# Patient Record
Sex: FEMALE | Race: WHITE | NOT HISPANIC OR LATINO | Employment: OTHER | URBAN - NONMETROPOLITAN AREA
[De-identification: names, ages, dates, MRNs, and addresses within clinical notes are randomized per-mention and may not be internally consistent; named-entity substitution may affect disease eponyms.]

---

## 2017-01-18 ENCOUNTER — OFFICE VISIT (OUTPATIENT)
Dept: MEDICAL GROUP | Facility: PHYSICIAN GROUP | Age: 67
End: 2017-01-18
Payer: MEDICARE

## 2017-01-18 VITALS
RESPIRATION RATE: 16 BRPM | BODY MASS INDEX: 35.2 KG/M2 | HEIGHT: 66 IN | SYSTOLIC BLOOD PRESSURE: 150 MMHG | TEMPERATURE: 97 F | DIASTOLIC BLOOD PRESSURE: 90 MMHG | WEIGHT: 219 LBS | HEART RATE: 75 BPM | OXYGEN SATURATION: 96 %

## 2017-01-18 DIAGNOSIS — E78.2 MIXED HYPERLIPIDEMIA: ICD-10-CM

## 2017-01-18 DIAGNOSIS — Z95.2 S/P AVR (AORTIC VALVE REPLACEMENT) AND AORTOPLASTY: ICD-10-CM

## 2017-01-18 DIAGNOSIS — Z00.00 HEALTH CARE MAINTENANCE: ICD-10-CM

## 2017-01-18 DIAGNOSIS — Z78.0 POST-MENOPAUSE: ICD-10-CM

## 2017-01-18 DIAGNOSIS — Z12.11 SCREEN FOR COLON CANCER: ICD-10-CM

## 2017-01-18 DIAGNOSIS — Z13.820 SCREENING FOR OSTEOPOROSIS: ICD-10-CM

## 2017-01-18 DIAGNOSIS — E55.9 VITAMIN D DEFICIENCY: ICD-10-CM

## 2017-01-18 DIAGNOSIS — E53.8 VITAMIN B12 DEFICIENCY: ICD-10-CM

## 2017-01-18 PROCEDURE — 99214 OFFICE O/P EST MOD 30 MIN: CPT | Performed by: NURSE PRACTITIONER

## 2017-01-18 ASSESSMENT — PATIENT HEALTH QUESTIONNAIRE - PHQ9: CLINICAL INTERPRETATION OF PHQ2 SCORE: 0

## 2017-01-18 NOTE — MR AVS SNAPSHOT
"        Deana Doss   2017 8:00 AM   Office Visit   MRN: [id number]    Department:  [facility]   Dept Phone:  [phone]    Description:  Female : 1950   Provider:  GILBERT Harry           Reason for Visit     New Patient est care.       Allergies as of 2017     Allergen Noted Reactions    Cipro Xr 2014   Hives      You were diagnosed with     S/P AVR (aortic valve replacement) and aortoplasty   [205856]       Vitamin B12 deficiency   [026837]       Mixed hyperlipidemia   [272.2.ICD-9-CM]       Health care maintenance   [037576]       Post-menopause   [731275]       Screening for osteoporosis   [525229]       Screen for colon cancer   [978489]       Elevated BP   [605957]         Vital Signs     Blood Pressure Pulse Temperature Respirations Height Weight    150/90 mmHg 75 36.1 °C (97 °F) 16 1.664 m (5' 5.5\") 99.338 kg (219 lb)    Body Mass Index Oxygen Saturation Smoking Status             35.88 kg/m2 96% Former Smoker         Basic Information     Date Of Birth Sex Race Ethnicity Preferred Language    1950 Female White Non- English      Your appointments     2017  7:30 AM   Adult Draw/Collection with [facility]   [facility] (--)    [address] [facility] [address]   [phone]            2017 10:40 AM   FOLLOW UP with Sarath Álvarez M.D.   [facility] (--)    [address] [facility] [id number] [phone]            2018  7:40 AM   Established Patient with GILBERT Harry   [facility] (--)    [address] [facility] [id number] [phone]           You will be receiving a confirmation call a few days before your appointment from our automated call confirmation system.              Problem List              ICD-10-CM Priority Class Noted - Resolved    Mixed hyperlipidemia E78.2   2012 - Present    Obese E66.9   " 4/30/2012 - Present    Vitamin D deficiency E55.9   7/5/2012 - Present    Postmenopausal atrophic vaginitis N95.2   8/16/2012 - Present    S/P AVR (aortic valve replacement) and aortoplasty Z95.2   8/11/2014 - Present    LBBB (left bundle branch block) I44.7   8/11/2014 - Present    Cutaneous skin tags L91.8   2/10/2016 - Present    Vitamin B12 deficiency E53.8   2/10/2016 - Present    Health care maintenance Z00.00   1/18/2017 - Present      Health Maintenance        Date Due Completion Dates    IMM DTaP/Tdap/Td Vaccine (1 - Tdap) 11/21/1969 ---    MAMMOGRAM 11/21/1990 ---    COLONOSCOPY 11/21/2000 ---    IMM ZOSTER VACCINE 11/21/2010 ---    PAP SMEAR 9/14/2015 9/14/2012    BONE DENSITY 11/21/2015 ---    IMM PNEUMOCOCCAL 65+ (ADULT) LOW/MEDIUM RISK SERIES (1 of 2 - PCV13) 11/21/2015 4/21/2011    IMM INFLUENZA (1) 9/1/2016 10/21/2013            Current Immunizations     Influenza TIV (IM) 10/21/2013    Pneumococcal polysaccharide vaccine (PPSV-23) 4/21/2011      Below and/or attached are the medications your provider expects you to take. Review all of your home medications and newly ordered medications with your provider and/or pharmacist. Follow medication instructions as directed by your provider and/or pharmacist. Please keep your medication list with you and share with your provider. Update the information when medications are discontinued, doses are changed, or new medications (including over-the-counter products) are added; and carry medication information at all times in the event of emergency situations     Allergies:  CIPRO XR - Hives               Medications  Valid as of: January 18, 2017 -  8:21 AM    Generic Name Brand Name Tablet Size Instructions for use    Aspirin (Tablet Delayed Response) ECOTRIN 81 MG Take 81 mg by mouth every day.        .                 Medicines prescribed today were sent to:     [facility] [place] - [address]    [address] [place] [address]     Phone: [phone] Fax: [phone]    Open 24 Hours?: No    [facility] - [place], [place] - [address] AT [facility] [address]    [address] [place] [address]    Phone: [phone] Fax: [phone]    Open 24 Hours?: No      Medication refill instructions:       If your prescription bottle indicates you have medication refills left, it is not necessary to call your provider’s office. Please contact your pharmacy and they will refill your medication.    If your prescription bottle indicates you do not have any refills left, you may request refills at any time through one of the following ways: The online "[employer] system (except Urgent Care), by calling your provider’s office, or by asking your pharmacy to contact your provider’s office with a refill request. Medication refills are processed only during regular business hours and may not be available until the next business day. Your provider may request additional information or to have a follow-up visit with you prior to refilling your medication.   *Please Note: Medication refills are assigned a new Rx number when refilled electronically. Your pharmacy may indicate that no refills were authorized even though a new prescription for the same medication is available at the pharmacy. Please request the medicine by name with the pharmacy before contacting your provider for a refill.        Your To Do List     Future Labs/Procedures Complete By Expires    CBC WITHOUT DIFFERENTIAL  As directed 7/21/2017    COMP METABOLIC PANEL  As directed 1/19/2018    DS-BONE DENSITY STUDY (DEXA)  As directed 1/18/2018    LIPID PROFILE  As directed 1/19/2018    OCCULT BLOOD FECES IMMUNOASSAY  As directed 1/19/2018         "[employer] Access Code: Activation code not generated  Current "[employer] Status: Active

## 2017-01-18 NOTE — ASSESSMENT & PLAN NOTE
Ongoing, unclear control. Patient has had some abnormal vitamin D levels, she was started on vitamin D prescription replacement in the past though strangely this caused worsening joint pain. She no longer takes vitamin D, she denies myalgias, joint pain, fatigue.

## 2017-01-18 NOTE — ASSESSMENT & PLAN NOTE
"New problem. Patient reports that in the past she had elevated BP which resolved after aortic valve replacement. She is not on medication. She tells me that her BP fluctuates from 130s-150s, she monitors at home. She denies CP, HA, SOB, change in vision. We discussed having her follow up in 2 weeks for BP check, she declines stating \"it's really nothing to worry about.\"  "

## 2017-01-18 NOTE — ASSESSMENT & PLAN NOTE
Aortic valve replacement in 2014, managed by Dr. Álvarez cardiology without complication, no mention of CP, SOB, profound fatigue, palpitation. Dr. Álvarez recommends statin therapy to LDL target, patient prefers to manage with TLM. She is due for lipid.

## 2017-01-18 NOTE — ASSESSMENT & PLAN NOTE
Refuses mammogram, despite education on risks   FIT reluctant but agreeable   prevnar 13 end of september 2016, due for pneumovax 9/2017  zostavax 5-6 years ago   Has never had DEXA

## 2017-01-18 NOTE — PROGRESS NOTES
"[facility]  Primary Care Office Visit - Problem-Oriented        History:     Deana Doss is a 66 y.o. female who is here today to discuss New Patient    This is a previously established patient, here to establish care with new provider and discuss chronic issues.      S/P AVR (aortic valve replacement) and aortoplasty  Aortic valve replacement in 2014, managed by Dr. Álvarez cardiology without complication, no mention of CP, SOB, profound fatigue, palpitation. Dr. Álvarez recommends statin therapy to LDL target, patient prefers to manage with TLM. She is due for lipid.        Vitamin B12 deficiency  No longer taking sublingual B12, no complaints of fatigue. Would like CBC checked.      Mixed hyperlipidemia  Chronic, uncontrolled. LDL last checked in October 2016, LDL not at goal, 135. Patient prefers to manage with lifestyle modifications. Due for repeat lipid.     Health care maintenance  Refuses mammogram, despite education on risks   FIT reluctant but agreeable   prevnar 13 end of september 2016, due for pneumovax 9/2017  zostavax 5-6 years ago   Has never had DEXA      Vitamin D deficiency  Ongoing, unclear control. Patient has had some abnormal vitamin D levels, she was started on vitamin D prescription replacement in the past though strangely this caused worsening joint pain. She no longer takes vitamin D, she denies myalgias, joint pain, fatigue.        Elevated BP  New problem. Patient reports that in the past she had elevated BP which resolved after aortic valve replacement. She is not on medication. She tells me that her BP fluctuates from 130s-150s, she monitors at home. She denies CP, HA, SOB, change in vision. We discussed having her follow up in 2 weeks for BP check, she declines stating \"it's really nothing to worry about.\"          Past Medical History   Diagnosis Date   • Mixed hyperlipidemia 4/30/2012   • Elevated BP 4/30/2012   • Hypertension    • Aortic stenosis 4/22/2014 "   • S/P AVR (aortic valve replacement) and aortoplasty 4/24/2014     Dr. Rutledge,  23 mm Hurst Perimount Magna pericardial valve, aortic root enlargement (1.5 cm CorMatrix patch),      • Atrial fibrillation (CMS-[facility]) 4/24/2014     Transient, perioperative and not recurrent     Past Surgical History   Procedure Laterality Date   • Wrist orif  9/22/2010     Performed by OSGOOD, PATRICK J at SURGERY [facility]   • Tendon repair  9/22/2010     Performed by OSGOOD, PATRICK J at SURGERY [facility]   • Cystectomy  2007     Bracial Cleft cyst removed at [facility]   • Aortic valve replacement  4/25/2014     Performed by Lilly Rutledge M.D. at SURGERY [facility]     Social History     Social History   • Marital Status:      Spouse Name: N/A   • Number of Children: N/A   • Years of Education: N/A     Occupational History   • Not on file.     Social History Main Topics   • Smoking status: Former Smoker -- 2.00 packs/day for 30 years     Types: Cigarettes     Quit date: 04/21/1996   • Smokeless tobacco: Never Used   • Alcohol Use: 0.0 oz/week      Comment: occ   • Drug Use: No   • Sexual Activity:     Partners: Male     Birth Control/ Protection: Post-Menopausal      Comment: , retired     Other Topics Concern   • Not on file     Social History Narrative     History   Smoking status   • Former Smoker -- 2.00 packs/day for 30 years   • Types: Cigarettes   • Quit date: 04/21/1996   Smokeless tobacco   • Never Used     Family History   Problem Relation Age of Onset   • Non-contributory Mother      basically healthy   • Heart Attack Father 76     MI   • Hypertension Father    • Hyperlipidemia Father    • Diabetes Paternal Grandfather 80     Type II Diabetes     Allergies   Allergen Reactions   • Cipro Xr Hives       Problem List:     Patient Active Problem List    Diagnosis Date Noted   • Health care maintenance 01/18/2017   • Cutaneous skin tags 02/10/2016   • Vitamin B12 deficiency 02/10/2016   • S/P AVR  "(aortic valve replacement) and aortoplasty 08/11/2014   • LBBB (left bundle branch block) 08/11/2014   • Postmenopausal atrophic vaginitis 08/16/2012   • Vitamin D deficiency 07/05/2012   • Mixed hyperlipidemia 04/30/2012   • Elevated BP 04/30/2012   • Obese 04/30/2012         Medications:     Current outpatient prescriptions:   •  aspirin EC (ECOTRIN) 81 MG TBEC, Take 81 mg by mouth every day., Disp: , Rfl:       Review of Systems:     Comprehensive review of systems negative      Physical Assessment:     VS: /90 mmHg  Pulse 75  Temp(Src) 36.1 °C (97 °F)  Resp 16  Ht 1.664 m (5' 5.5\")  Wt 99.338 kg (219 lb)  BMI 35.88 kg/m2  SpO2 96%    General: Well-developed, well-nourished female, obese body habitus     Head: PERRL, EOMI. Normocephalic. No facial asymmetry noted.  Cardiovasc:No JVD.  Systolic murmur. RRR. No thrills or bruits. Pulses 2+ and symmetric at all distal extremities.  Pulmonary: Lungs clear bilaterally.  Normal respiratory effort. No wheeze or crackles.   Extremities: No edema, no TTP bilateral calves. Pedal pulses intact. No joint effusions. LEs warm and well-perfused.  Neuro: Alert and oriented, CNs II-XII intact, no focal deficits.  Skin: scab from recent cryotherapy noted on right temple. No rashes noted. Skin warm, dry, intact    Psych: Dressed appropriately for the weather, pleasant and conversant.  Affect, mood & judgment appropriate.    Assessment/Plan:   Deana was seen today for new patient.    Diagnoses and all orders for this visit:    S/P AVR (aortic valve replacement) and aortoplasty, stable   - managed by cardiology     -     LIPID PROFILE; Future  -     COMP METABOLIC PANEL; Future    Elevated BP, new   - patient refuses to follow up for 2 weeks BP check, we discussed TLM    Vitamin B12 deficiency, chronic, unclear control   -     CBC WITHOUT DIFFERENTIAL; Future    Mixed hyperlipidemia, chronic, uncontrolled   - discussed goal LDL & TLM, repeat lipid ordered, she will " follow up with Dr. Whitley 4/3/17.  -     LIPID PROFILE; Future  -     COMP METABOLIC PANEL; Future    Health care maintenance  - see HPI for recommendations and discussion     Post-menopause  -     DS-BONE DENSITY STUDY (DEXA); Future    Screening for osteoporosis  -     DS-BONE DENSITY STUDY (DEXA); Future    Screen for colon cancer  -     OCCULT BLOOD FECES IMMUNOASSAY; Future    Patient is agreeable to the above plan and voiced understanding. All questions answered.     Please note that this dictation was created using voice recognition software. I have made every reasonable attempt to correct obvious errors, but I expect that there are errors of grammar and possibly content that I did not discover before finalizing the note.      BENJAMÍN Giordano  1/18/2017, 8:26 AM

## 2017-01-18 NOTE — ASSESSMENT & PLAN NOTE
Chronic, uncontrolled. LDL last checked in October 2016, LDL not at goal, 135. Patient prefers to manage with lifestyle modifications. Due for repeat lipid.

## 2017-01-23 ENCOUNTER — HOSPITAL ENCOUNTER (OUTPATIENT)
Dept: LAB | Facility: MEDICAL CENTER | Age: 67
End: 2017-01-23
Attending: NURSE PRACTITIONER
Payer: MEDICARE

## 2017-01-23 DIAGNOSIS — E53.8 VITAMIN B12 DEFICIENCY: ICD-10-CM

## 2017-01-23 DIAGNOSIS — Z95.2 S/P AVR (AORTIC VALVE REPLACEMENT) AND AORTOPLASTY: ICD-10-CM

## 2017-01-23 DIAGNOSIS — E78.2 MIXED HYPERLIPIDEMIA: ICD-10-CM

## 2017-01-23 LAB
ALBUMIN SERPL BCP-MCNC: 4.3 G/DL (ref 3.2–4.9)
ALBUMIN/GLOB SERPL: 1.3 G/DL
ALP SERPL-CCNC: 101 U/L (ref 30–99)
ALT SERPL-CCNC: 14 U/L (ref 2–50)
ANION GAP SERPL CALC-SCNC: 1 MMOL/L (ref 0–11.9)
AST SERPL-CCNC: 18 U/L (ref 12–45)
BILIRUB SERPL-MCNC: 0.6 MG/DL (ref 0.1–1.5)
BUN SERPL-MCNC: 16 MG/DL (ref 8–22)
CALCIUM SERPL-MCNC: 9.6 MG/DL (ref 8.5–10.5)
CHLORIDE SERPL-SCNC: 103 MMOL/L (ref 96–112)
CHOLEST SERPL-MCNC: 231 MG/DL (ref 100–199)
CO2 SERPL-SCNC: 35 MMOL/L (ref 20–33)
CREAT SERPL-MCNC: 0.97 MG/DL (ref 0.5–1.4)
ERYTHROCYTE [DISTWIDTH] IN BLOOD BY AUTOMATED COUNT: 41.1 FL (ref 35.9–50)
GLOBULIN SER CALC-MCNC: 3.3 G/DL (ref 1.9–3.5)
GLUCOSE SERPL-MCNC: 90 MG/DL (ref 65–99)
HCT VFR BLD AUTO: 46.4 % (ref 37–47)
HDLC SERPL-MCNC: 57 MG/DL
HGB BLD-MCNC: 14.7 G/DL (ref 12–16)
LDLC SERPL CALC-MCNC: 143 MG/DL
MCH RBC QN AUTO: 28.9 PG (ref 27–33)
MCHC RBC AUTO-ENTMCNC: 31.7 G/DL (ref 33.6–35)
MCV RBC AUTO: 91.3 FL (ref 81.4–97.8)
PLATELET # BLD AUTO: 241 K/UL (ref 164–446)
PMV BLD AUTO: 11.9 FL (ref 9–12.9)
POTASSIUM SERPL-SCNC: 4.5 MMOL/L (ref 3.6–5.5)
PROT SERPL-MCNC: 7.6 G/DL (ref 6–8.2)
RBC # BLD AUTO: 5.08 M/UL (ref 4.2–5.4)
SODIUM SERPL-SCNC: 139 MMOL/L (ref 135–145)
TRIGL SERPL-MCNC: 154 MG/DL (ref 0–149)
WBC # BLD AUTO: 7.9 K/UL (ref 4.8–10.8)

## 2017-01-23 PROCEDURE — 80061 LIPID PANEL: CPT

## 2017-01-23 PROCEDURE — 36415 COLL VENOUS BLD VENIPUNCTURE: CPT

## 2017-01-23 PROCEDURE — 85027 COMPLETE CBC AUTOMATED: CPT

## 2017-01-23 PROCEDURE — 80053 COMPREHEN METABOLIC PANEL: CPT

## 2017-04-03 ENCOUNTER — OFFICE VISIT (OUTPATIENT)
Dept: CARDIOLOGY | Facility: PHYSICIAN GROUP | Age: 67
End: 2017-04-03
Payer: MEDICARE

## 2017-04-03 VITALS
OXYGEN SATURATION: 92 % | WEIGHT: 218 LBS | BODY MASS INDEX: 35.03 KG/M2 | HEIGHT: 66 IN | SYSTOLIC BLOOD PRESSURE: 130 MMHG | DIASTOLIC BLOOD PRESSURE: 80 MMHG | HEART RATE: 81 BPM

## 2017-04-03 DIAGNOSIS — E78.2 MIXED HYPERLIPIDEMIA: ICD-10-CM

## 2017-04-03 DIAGNOSIS — I44.7 LBBB (LEFT BUNDLE BRANCH BLOCK): ICD-10-CM

## 2017-04-03 DIAGNOSIS — Z95.2 S/P AVR (AORTIC VALVE REPLACEMENT) AND AORTOPLASTY: ICD-10-CM

## 2017-04-03 PROCEDURE — G8432 DEP SCR NOT DOC, RNG: HCPCS | Performed by: INTERNAL MEDICINE

## 2017-04-03 PROCEDURE — 99213 OFFICE O/P EST LOW 20 MIN: CPT | Performed by: INTERNAL MEDICINE

## 2017-04-03 PROCEDURE — 3014F SCREEN MAMMO DOC REV: CPT | Mod: 8P | Performed by: INTERNAL MEDICINE

## 2017-04-03 PROCEDURE — 1101F PT FALLS ASSESS-DOCD LE1/YR: CPT | Mod: 8P | Performed by: INTERNAL MEDICINE

## 2017-04-03 PROCEDURE — 1036F TOBACCO NON-USER: CPT | Performed by: INTERNAL MEDICINE

## 2017-04-03 PROCEDURE — G8419 CALC BMI OUT NRM PARAM NOF/U: HCPCS | Performed by: INTERNAL MEDICINE

## 2017-04-03 PROCEDURE — 4040F PNEUMOC VAC/ADMIN/RCVD: CPT | Performed by: INTERNAL MEDICINE

## 2017-04-03 ASSESSMENT — ENCOUNTER SYMPTOMS
WHEEZING: 0
BRUISES/BLEEDS EASILY: 0
MYALGIAS: 0
ORTHOPNEA: 0
COUGH: 0
FALLS: 0
PND: 0

## 2017-04-03 ASSESSMENT — LIFESTYLE VARIABLES: SUBSTANCE_ABUSE: 0

## 2017-04-03 NOTE — Clinical Note
[facility] [address]  Phone: [phone]  Fax: [phone]              Deana Doss  1950    Encounter Date: 4/3/2017    Sarath Álvarez M.D.          PROGRESS NOTE:  Subjective:   Deana Doss is a 66 y.o. female who presents today for follow-up of her aortic valve. Blood pressure has been well-controlled.  She has been off of her lipid diet recently.  Cardiac status has been clinically stable since last visit.  No chest pain, palpitations, orthopnea, edema, syncope, or near-syncope.  Exertional capacity has been stable.    Past Medical History   Diagnosis Date   • Mixed hyperlipidemia 4/30/2012   • Elevated BP 4/30/2012   • Hypertension    • Aortic stenosis 4/22/2014   • S/P AVR (aortic valve replacement) and aortoplasty 4/24/2014     Dr. Rutledge,  23 mm Hurst Perimount Magna pericardial valve, aortic root enlargement (1.5 cm CorMatrix patch),      • Atrial fibrillation (CMS-[facility]) 4/24/2014     Transient, perioperative and not recurrent     Past Surgical History   Procedure Laterality Date   • Wrist orif  9/22/2010     Performed by OSGOOD, PATRICK J at SURGERY [facility]   • Tendon repair  9/22/2010     Performed by OSGOOD, PATRICK J at SURGERY [facility]   • Cystectomy  2007     Bracial Cleft cyst removed at [facility]   • Aortic valve replacement  4/25/2014     Performed by Lilly Rutledge M.D. at SURGERY [facility]     Family History   Problem Relation Age of Onset   • Non-contributory Mother      basically healthy   • Heart Attack Father 76     MI   • Hypertension Father    • Hyperlipidemia Father    • Diabetes Paternal Grandfather 80     Type II Diabetes     History   Smoking status   • Former Smoker -- 2.00 packs/day for 30 years   • Types: Cigarettes   • Quit date: 04/21/1996   Smokeless tobacco   • Never Used     Allergies   Allergen Reactions   • Cipro Xr Hives     Outpatient Encounter  "Prescriptions as of 4/3/2017   Medication Sig Dispense Refill   • aspirin EC (ECOTRIN) 81 MG TBEC Take 81 mg by mouth every day.       No facility-administered encounter medications on file as of 4/3/2017.     Review of Systems   HENT: Negative for nosebleeds.    Respiratory: Negative for cough and wheezing.    Cardiovascular: Negative for orthopnea and PND.   Musculoskeletal: Negative for myalgias and falls.   Endo/Heme/Allergies: Does not bruise/bleed easily.   Psychiatric/Behavioral: Negative for substance abuse.        Objective:   /80 mmHg  Pulse 81  Ht 1.676 m (5' 5.98\")  Wt 98.884 kg (218 lb)  BMI 35.20 kg/m2  SpO2 92%    Physical Exam   Constitutional: She is oriented to person, place, and time. She appears well-developed and well-nourished.   Eyes: Conjunctivae are normal. No scleral icterus.   Neck: No JVD present.   Cardiovascular: Normal rate, regular rhythm, S1 normal, S2 normal and intact distal pulses.  Exam reveals no gallop, no S3, no S4 and no friction rub.    Murmur (2/6 short carrie, LSB) heard.  Pulmonary/Chest: Effort normal and breath sounds normal.   Musculoskeletal: She exhibits no edema.   Neurological: She is alert and oriented to person, place, and time.   Skin: Skin is warm and dry.   Psychiatric: She has a normal mood and affect. Thought content normal.       DonyaWe reviewed her lipids.sment:     1. S/P AVR (aortic valve replacement) and aortoplasty     2. LBBB (left bundle branch block)     3. Mixed hyperlipidemia       We reviewed her lipids. She does not want to consider statin therapy or other drug therapy but wants to work on diet. Otherwise cardiac status is stable.  Medical Decision Making:  Today's Assessment / Status / Plan:     She will continue primary follow-up with Sandi Jay and annual follow-up with cardiology but she will check her lipids after 3-4 months of dietary attention and call for results and consider statin therapy if LDL has increased further. " Immediate reevaluation if any symptoms.      No Recipients

## 2017-04-03 NOTE — PROGRESS NOTES
Subjective:   Deana Doss is a 66 y.o. female who presents today for follow-up of her aortic valve. Blood pressure has been well-controlled.  She has been off of her lipid diet recently.  Cardiac status has been clinically stable since last visit.  No chest pain, palpitations, orthopnea, edema, syncope, or near-syncope.  Exertional capacity has been stable.    Past Medical History   Diagnosis Date   • Mixed hyperlipidemia 4/30/2012   • Elevated BP 4/30/2012   • Hypertension    • Aortic stenosis 4/22/2014   • S/P AVR (aortic valve replacement) and aortoplasty 4/24/2014     Dr. Rutledge,  23 mm Hurst Perimount Magna pericardial valve, aortic root enlargement (1.5 cm CorMatrix patch),      • Atrial fibrillation (CMS-[facility]) 4/24/2014     Transient, perioperative and not recurrent     Past Surgical History   Procedure Laterality Date   • Wrist orif  9/22/2010     Performed by OSGOOD, PATRICK J at SURGERY [facility]   • Tendon repair  9/22/2010     Performed by OSGOOD, PATRICK J at [facility]   • Cystectomy  2007     Bracial Cleft cyst removed at [facility]   • Aortic valve replacement  4/25/2014     Performed by Lilly Rutledge M.D. at SURGERY [facility]     Family History   Problem Relation Age of Onset   • Non-contributory Mother      basically healthy   • Heart Attack Father 76     MI   • Hypertension Father    • Hyperlipidemia Father    • Diabetes Paternal Grandfather 80     Type II Diabetes     History   Smoking status   • Former Smoker -- 2.00 packs/day for 30 years   • Types: Cigarettes   • Quit date: 04/21/1996   Smokeless tobacco   • Never Used     Allergies   Allergen Reactions   • Cipro Xr Hives     Outpatient Encounter Prescriptions as of 4/3/2017   Medication Sig Dispense Refill   • aspirin EC (ECOTRIN) 81 MG TBEC Take 81 mg by mouth every day.       No facility-administered encounter medications on file as of 4/3/2017.     Review of Systems   HENT: Negative for nosebleeds.   "  Respiratory: Negative for cough and wheezing.    Cardiovascular: Negative for orthopnea and PND.   Musculoskeletal: Negative for myalgias and falls.   Endo/Heme/Allergies: Does not bruise/bleed easily.   Psychiatric/Behavioral: Negative for substance abuse.        Objective:   /80 mmHg  Pulse 81  Ht 1.676 m (5' 5.98\")  Wt 98.884 kg (218 lb)  BMI 35.20 kg/m2  SpO2 92%    Physical Exam   Constitutional: She is oriented to person, place, and time. She appears well-developed and well-nourished.   Eyes: Conjunctivae are normal. No scleral icterus.   Neck: No JVD present.   Cardiovascular: Normal rate, regular rhythm, S1 normal, S2 normal and intact distal pulses.  Exam reveals no gallop, no S3, no S4 and no friction rub.    Murmur (2/6 short carrie, LSB) heard.  Pulmonary/Chest: Effort normal and breath sounds normal.   Musculoskeletal: She exhibits no edema.   Neurological: She is alert and oriented to person, place, and time.   Skin: Skin is warm and dry.   Psychiatric: She has a normal mood and affect. Thought content normal.       AssesWe reviewed her lipids.sment:     1. S/P AVR (aortic valve replacement) and aortoplasty     2. LBBB (left bundle branch block)     3. Mixed hyperlipidemia       We reviewed her lipids. She does not want to consider statin therapy or other drug therapy but wants to work on diet. Otherwise cardiac status is stable.  Medical Decision Making:  Today's Assessment / Status / Plan:     She will continue primary follow-up with Sandi Jay and annual follow-up with cardiology but she will check her lipids after 3-4 months of dietary attention and call for results and consider statin therapy if LDL has increased further. Immediate reevaluation if any symptoms.  "

## 2017-06-02 ENCOUNTER — HOSPITAL ENCOUNTER (OUTPATIENT)
Dept: LAB | Facility: MEDICAL CENTER | Age: 67
End: 2017-06-02
Attending: INTERNAL MEDICINE
Payer: MEDICARE

## 2017-06-02 LAB
CHOLEST SERPL-MCNC: 236 MG/DL (ref 100–199)
HDLC SERPL-MCNC: 60 MG/DL
LDLC SERPL CALC-MCNC: 145 MG/DL
TRIGL SERPL-MCNC: 154 MG/DL (ref 0–149)

## 2017-06-02 PROCEDURE — 36415 COLL VENOUS BLD VENIPUNCTURE: CPT

## 2017-06-02 PROCEDURE — 80061 LIPID PANEL: CPT

## 2017-06-29 ENCOUNTER — TELEPHONE (OUTPATIENT)
Dept: CARDIOLOGY | Facility: MEDICAL CENTER | Age: 67
End: 2017-06-29

## 2017-06-29 NOTE — TELEPHONE ENCOUNTER
----- Message from Sarath Álvarez M.D. sent at 6/29/2017  7:20 AM PDT -----    Please let her know LDL no better and I don't think she has seen Sandi Jay since I saw her therefore we need to start statin and check CMP and LP in 4-6 weeks.  Please start atorva 20mg daily if she agrees.  Thanks.

## 2017-11-07 ENCOUNTER — APPOINTMENT (RX ONLY)
Dept: URBAN - NONMETROPOLITAN AREA CLINIC 15 | Facility: CLINIC | Age: 67
Setting detail: DERMATOLOGY
End: 2017-11-07

## 2017-11-07 DIAGNOSIS — D18.0 HEMANGIOMA: ICD-10-CM

## 2017-11-07 DIAGNOSIS — L81.4 OTHER MELANIN HYPERPIGMENTATION: ICD-10-CM

## 2017-11-07 DIAGNOSIS — L82.1 OTHER SEBORRHEIC KERATOSIS: ICD-10-CM

## 2017-11-07 DIAGNOSIS — L57.0 ACTINIC KERATOSIS: ICD-10-CM

## 2017-11-07 DIAGNOSIS — L82.0 INFLAMED SEBORRHEIC KERATOSIS: ICD-10-CM

## 2017-11-07 DIAGNOSIS — D22 MELANOCYTIC NEVI: ICD-10-CM

## 2017-11-07 DIAGNOSIS — Z85.828 PERSONAL HISTORY OF OTHER MALIGNANT NEOPLASM OF SKIN: ICD-10-CM

## 2017-11-07 PROBLEM — D18.01 HEMANGIOMA OF SKIN AND SUBCUTANEOUS TISSUE: Status: ACTIVE | Noted: 2017-11-07

## 2017-11-07 PROBLEM — D22.61 MELANOCYTIC NEVI OF RIGHT UPPER LIMB, INCLUDING SHOULDER: Status: ACTIVE | Noted: 2017-11-07

## 2017-11-07 PROBLEM — D22.62 MELANOCYTIC NEVI OF LEFT UPPER LIMB, INCLUDING SHOULDER: Status: ACTIVE | Noted: 2017-11-07

## 2017-11-07 PROCEDURE — 17003 DESTRUCT PREMALG LES 2-14: CPT

## 2017-11-07 PROCEDURE — ? LIQUID NITROGEN

## 2017-11-07 PROCEDURE — 99213 OFFICE O/P EST LOW 20 MIN: CPT | Mod: 25

## 2017-11-07 PROCEDURE — ? COUNSELING

## 2017-11-07 PROCEDURE — 17110 DESTRUCTION B9 LES UP TO 14: CPT

## 2017-11-07 PROCEDURE — 17000 DESTRUCT PREMALG LESION: CPT | Mod: 59

## 2017-11-07 PROCEDURE — ? OBSERVATION

## 2017-11-07 ASSESSMENT — LOCATION ZONE DERM
LOCATION ZONE: NECK
LOCATION ZONE: TRUNK
LOCATION ZONE: EAR
LOCATION ZONE: HAND
LOCATION ZONE: NOSE
LOCATION ZONE: FACE
LOCATION ZONE: ARM

## 2017-11-07 ASSESSMENT — LOCATION SIMPLE DESCRIPTION DERM
LOCATION SIMPLE: RIGHT UPPER ARM
LOCATION SIMPLE: RIGHT EYEBROW
LOCATION SIMPLE: LEFT FOREARM
LOCATION SIMPLE: LEFT HAND
LOCATION SIMPLE: RIGHT HAND
LOCATION SIMPLE: RIGHT FOREHEAD
LOCATION SIMPLE: LEFT EYEBROW
LOCATION SIMPLE: CHEST
LOCATION SIMPLE: LEFT UPPER BACK
LOCATION SIMPLE: LEFT ANTERIOR NECK
LOCATION SIMPLE: RIGHT FOREARM
LOCATION SIMPLE: LEFT UPPER ARM
LOCATION SIMPLE: LEFT FOREHEAD
LOCATION SIMPLE: LEFT CHEEK
LOCATION SIMPLE: NOSE
LOCATION SIMPLE: RIGHT CLAVICULAR SKIN
LOCATION SIMPLE: RIGHT EAR
LOCATION SIMPLE: RIGHT CHEEK
LOCATION SIMPLE: RIGHT ANTERIOR NECK

## 2017-11-07 ASSESSMENT — LOCATION DETAILED DESCRIPTION DERM
LOCATION DETAILED: RIGHT RADIAL DORSAL HAND
LOCATION DETAILED: LEFT INFERIOR ANTERIOR NECK
LOCATION DETAILED: LEFT MEDIAL MALAR CHEEK
LOCATION DETAILED: LEFT VENTRAL PROXIMAL FOREARM
LOCATION DETAILED: LEFT PROXIMAL RADIAL DORSAL FOREARM
LOCATION DETAILED: NASAL DORSUM
LOCATION DETAILED: RIGHT INFERIOR ANTERIOR NECK
LOCATION DETAILED: LEFT ANTERIOR DISTAL UPPER ARM
LOCATION DETAILED: RIGHT INFERIOR MEDIAL FOREHEAD
LOCATION DETAILED: LEFT RADIAL DORSAL HAND
LOCATION DETAILED: RIGHT ANTERIOR DISTAL UPPER ARM
LOCATION DETAILED: RIGHT INFERIOR FOREHEAD
LOCATION DETAILED: LEFT VENTRAL DISTAL FOREARM
LOCATION DETAILED: RIGHT VENTRAL PROXIMAL FOREARM
LOCATION DETAILED: RIGHT CLAVICULAR SKIN
LOCATION DETAILED: RIGHT CLAVICULAR NECK
LOCATION DETAILED: RIGHT INFERIOR LATERAL NECK
LOCATION DETAILED: LEFT SUPERIOR FOREHEAD
LOCATION DETAILED: LEFT CENTRAL EYEBROW
LOCATION DETAILED: RIGHT ANTERIOR PROXIMAL UPPER ARM
LOCATION DETAILED: LEFT SUPERIOR MEDIAL UPPER BACK
LOCATION DETAILED: RIGHT INFERIOR HELIX
LOCATION DETAILED: RIGHT CENTRAL BUCCAL CHEEK
LOCATION DETAILED: RIGHT PROXIMAL RADIAL DORSAL FOREARM
LOCATION DETAILED: RIGHT LATERAL EYEBROW
LOCATION DETAILED: MIDDLE STERNUM
LOCATION DETAILED: LEFT INFERIOR MEDIAL MALAR CHEEK
LOCATION DETAILED: LEFT LATERAL EYEBROW
LOCATION DETAILED: LEFT ANTECUBITAL SKIN
LOCATION DETAILED: LEFT INFERIOR CENTRAL MALAR CHEEK
LOCATION DETAILED: LEFT FOREHEAD
LOCATION DETAILED: LEFT INFERIOR LATERAL NECK
LOCATION DETAILED: RIGHT VENTRAL DISTAL FOREARM
LOCATION DETAILED: RIGHT FOREHEAD

## 2017-11-07 NOTE — PROCEDURE: OBSERVATION
Detail Level: Detailed
Body Location Override (Optional - Billing Will Still Be Based On Selected Body Map Location If Applicable): Right lateral lower Forehead
Size Of Lesion In Cm (Optional): 0

## 2017-11-07 NOTE — PROCEDURE: LIQUID NITROGEN
Render Post-Care Instructions In Note?: no
Post-Care Instructions: I reviewed with the patient in detail post-care instructions. Patient is to wear sunprotection, and avoid picking at any of the treated lesions. Pt may apply Vaseline to crusted or scabbing areas.
Consent: The patient's consent was obtained including but not limited to risks of crusting, scabbing, blistering, scarring, darker or lighter pigmentary change, recurrence, incomplete removal and infection.
Duration Of Freeze Thaw-Cycle (Seconds): 0
Detail Level: Detailed
Medical Necessity Clause: This procedure was medically necessary because the lesions that were treated were:
Medical Necessity Information: It is in your best interest to select a reason for this procedure from the list below. All of these items fulfill various CMS LCD requirements except the new and changing color options.

## 2017-12-06 ENCOUNTER — TELEPHONE (OUTPATIENT)
Dept: MEDICAL GROUP | Facility: PHYSICIAN GROUP | Age: 67
End: 2017-12-06

## 2017-12-06 DIAGNOSIS — E53.8 VITAMIN B12 DEFICIENCY: ICD-10-CM

## 2017-12-06 DIAGNOSIS — E78.2 MIXED HYPERLIPIDEMIA: ICD-10-CM

## 2017-12-06 DIAGNOSIS — I15.9 SECONDARY HYPERTENSION: ICD-10-CM

## 2017-12-06 DIAGNOSIS — E55.9 VITAMIN D DEFICIENCY: ICD-10-CM

## 2017-12-06 NOTE — TELEPHONE ENCOUNTER
1. Caller Name: Deana                      Call Back Number: [phone] (home)       2. Message: Patient would like to know if she can get labs ordered before her next appointment 11/24/2018.     3. Patient approves office to leave a detailed voicemail/MyChart message: N\A

## 2018-01-16 ENCOUNTER — HOSPITAL ENCOUNTER (OUTPATIENT)
Dept: LAB | Facility: MEDICAL CENTER | Age: 68
End: 2018-01-16
Attending: NURSE PRACTITIONER
Payer: MEDICARE

## 2018-01-16 DIAGNOSIS — E53.8 VITAMIN B12 DEFICIENCY: ICD-10-CM

## 2018-01-16 DIAGNOSIS — I15.9 SECONDARY HYPERTENSION: ICD-10-CM

## 2018-01-16 DIAGNOSIS — E55.9 VITAMIN D DEFICIENCY: ICD-10-CM

## 2018-01-16 DIAGNOSIS — E78.2 MIXED HYPERLIPIDEMIA: ICD-10-CM

## 2018-01-16 LAB
25(OH)D3 SERPL-MCNC: 23 NG/ML (ref 30–100)
ALBUMIN SERPL BCP-MCNC: 4.4 G/DL (ref 3.2–4.9)
ALBUMIN/GLOB SERPL: 1.5 G/DL
ALP SERPL-CCNC: 87 U/L (ref 30–99)
ALT SERPL-CCNC: 11 U/L (ref 2–50)
ANION GAP SERPL CALC-SCNC: 8 MMOL/L (ref 0–11.9)
AST SERPL-CCNC: 19 U/L (ref 12–45)
BASOPHILS # BLD AUTO: 0.6 % (ref 0–1.8)
BASOPHILS # BLD: 0.04 K/UL (ref 0–0.12)
BILIRUB SERPL-MCNC: 0.7 MG/DL (ref 0.1–1.5)
BUN SERPL-MCNC: 14 MG/DL (ref 8–22)
CALCIUM SERPL-MCNC: 9.4 MG/DL (ref 8.5–10.5)
CHLORIDE SERPL-SCNC: 104 MMOL/L (ref 96–112)
CHOLEST SERPL-MCNC: 223 MG/DL (ref 100–199)
CO2 SERPL-SCNC: 29 MMOL/L (ref 20–33)
CREAT SERPL-MCNC: 0.85 MG/DL (ref 0.5–1.4)
EOSINOPHIL # BLD AUTO: 0.08 K/UL (ref 0–0.51)
EOSINOPHIL NFR BLD: 1.2 % (ref 0–6.9)
ERYTHROCYTE [DISTWIDTH] IN BLOOD BY AUTOMATED COUNT: 41.1 FL (ref 35.9–50)
GLOBULIN SER CALC-MCNC: 3 G/DL (ref 1.9–3.5)
GLUCOSE SERPL-MCNC: 81 MG/DL (ref 65–99)
HCT VFR BLD AUTO: 45.2 % (ref 37–47)
HDLC SERPL-MCNC: 60 MG/DL
HGB BLD-MCNC: 14.7 G/DL (ref 12–16)
IMM GRANULOCYTES # BLD AUTO: 0.03 K/UL (ref 0–0.11)
IMM GRANULOCYTES NFR BLD AUTO: 0.4 % (ref 0–0.9)
LDLC SERPL CALC-MCNC: 142 MG/DL
LYMPHOCYTES # BLD AUTO: 2.02 K/UL (ref 1–4.8)
LYMPHOCYTES NFR BLD: 29.2 % (ref 22–41)
MCH RBC QN AUTO: 29.8 PG (ref 27–33)
MCHC RBC AUTO-ENTMCNC: 32.5 G/DL (ref 33.6–35)
MCV RBC AUTO: 91.5 FL (ref 81.4–97.8)
MONOCYTES # BLD AUTO: 0.41 K/UL (ref 0–0.85)
MONOCYTES NFR BLD AUTO: 5.9 % (ref 0–13.4)
NEUTROPHILS # BLD AUTO: 4.34 K/UL (ref 2–7.15)
NEUTROPHILS NFR BLD: 62.7 % (ref 44–72)
NRBC # BLD AUTO: 0 K/UL
NRBC BLD-RTO: 0 /100 WBC
PLATELET # BLD AUTO: 218 K/UL (ref 164–446)
PMV BLD AUTO: 12.1 FL (ref 9–12.9)
POTASSIUM SERPL-SCNC: 4.8 MMOL/L (ref 3.6–5.5)
PROT SERPL-MCNC: 7.4 G/DL (ref 6–8.2)
RBC # BLD AUTO: 4.94 M/UL (ref 4.2–5.4)
SODIUM SERPL-SCNC: 141 MMOL/L (ref 135–145)
TRIGL SERPL-MCNC: 107 MG/DL (ref 0–149)
VIT B12 SERPL-MCNC: 558 PG/ML (ref 211–911)
WBC # BLD AUTO: 6.9 K/UL (ref 4.8–10.8)

## 2018-01-16 PROCEDURE — 82607 VITAMIN B-12: CPT

## 2018-01-16 PROCEDURE — 85025 COMPLETE CBC W/AUTO DIFF WBC: CPT

## 2018-01-16 PROCEDURE — 80053 COMPREHEN METABOLIC PANEL: CPT

## 2018-01-16 PROCEDURE — 36415 COLL VENOUS BLD VENIPUNCTURE: CPT

## 2018-01-16 PROCEDURE — 82306 VITAMIN D 25 HYDROXY: CPT

## 2018-01-16 PROCEDURE — 80061 LIPID PANEL: CPT

## 2018-01-24 ENCOUNTER — TELEPHONE (OUTPATIENT)
Dept: MEDICAL GROUP | Facility: PHYSICIAN GROUP | Age: 68
End: 2018-01-24

## 2018-01-24 ENCOUNTER — OFFICE VISIT (OUTPATIENT)
Dept: MEDICAL GROUP | Facility: PHYSICIAN GROUP | Age: 68
End: 2018-01-24
Payer: MEDICARE

## 2018-01-24 VITALS
DIASTOLIC BLOOD PRESSURE: 80 MMHG | HEIGHT: 66 IN | SYSTOLIC BLOOD PRESSURE: 130 MMHG | OXYGEN SATURATION: 93 % | TEMPERATURE: 97.9 F | WEIGHT: 215.3 LBS | HEART RATE: 82 BPM | RESPIRATION RATE: 16 BRPM | BODY MASS INDEX: 34.6 KG/M2

## 2018-01-24 DIAGNOSIS — Z00.00 HEALTH CARE MAINTENANCE: ICD-10-CM

## 2018-01-24 DIAGNOSIS — E78.2 MIXED HYPERLIPIDEMIA: ICD-10-CM

## 2018-01-24 DIAGNOSIS — M85.80 OSTEOPENIA DETERMINED BY X-RAY: ICD-10-CM

## 2018-01-24 DIAGNOSIS — N95.2 POSTMENOPAUSAL ATROPHIC VAGINITIS: ICD-10-CM

## 2018-01-24 DIAGNOSIS — Z00.00 MEDICARE ANNUAL WELLNESS VISIT, INITIAL: Primary | ICD-10-CM

## 2018-01-24 DIAGNOSIS — Z23 NEED FOR VACCINATION: ICD-10-CM

## 2018-01-24 DIAGNOSIS — E66.9 OBESITY (BMI 35.0-39.9 WITHOUT COMORBIDITY): ICD-10-CM

## 2018-01-24 DIAGNOSIS — Z95.2 S/P AVR (AORTIC VALVE REPLACEMENT) AND AORTOPLASTY: ICD-10-CM

## 2018-01-24 PROCEDURE — 90471 IMMUNIZATION ADMIN: CPT | Performed by: NURSE PRACTITIONER

## 2018-01-24 PROCEDURE — G0438 PPPS, INITIAL VISIT: HCPCS | Mod: 25 | Performed by: NURSE PRACTITIONER

## 2018-01-24 PROCEDURE — 90715 TDAP VACCINE 7 YRS/> IM: CPT | Performed by: NURSE PRACTITIONER

## 2018-01-24 ASSESSMENT — PATIENT HEALTH QUESTIONNAIRE - PHQ9: CLINICAL INTERPRETATION OF PHQ2 SCORE: 0

## 2018-01-24 NOTE — ASSESSMENT & PLAN NOTE
DEXA results are consistent with osteopenia, recommend vitamin D, calcium, regular weightbearing exercise.

## 2018-01-24 NOTE — PROGRESS NOTES
Chief Complaint   Patient presents with   • Annual Exam         HPI:  Deana Doss is a 67 y.o. here for Medicare Annual Wellness Visit     S/P AVR (aortic valve replacement) and aortoplasty  Patient is a 67-year-old female who is status post aortic valve replacement in 2014, doing well, no chest pain, shortness of breath, fatigue, palpitations, syncope. She is following with cardiology, unfortunately Dr. Álvarez has retired, she will establish with cardiology next month. Unfortunately, lipids are not at goal, she is quite adamant about avoiding statin, she is concerned that they cause dementia, we did discuss that there is no literature to support this claim, nevertheless she prefers to avoid statin and would prefer to work on dietary modifications and exercise.    Postmenopausal atrophic vaginitis  Patient does report postmenopausal atrophic vaginitis, this is not bothersome, she is not sexually active.     Mixed hyperlipidemia  As previously noted patient's LDL is not at goal, recommend statin, patient is adamant about avoiding statins, she prefers to manage with lifestyle modifications.    Health care maintenance  Patient adamantly declines mammogram, colonoscopy, if IT. She has had bone density scan, unfortunately do not have these records. She is up-to-date on pneumonia vaccines. She knows vaccine was 5-6 years ago.      Patient Active Problem List    Diagnosis Date Noted   • Obesity (BMI 35.0-39.9 without comorbidity) ([facility]) 01/24/2018   • Health care maintenance 01/18/2017   • S/P AVR (aortic valve replacement) and aortoplasty 08/11/2014   • LBBB (left bundle branch block) 08/11/2014   • Postmenopausal atrophic vaginitis 08/16/2012   • Mixed hyperlipidemia 04/30/2012       Current Outpatient Prescriptions   Medication Sig Dispense Refill   • aspirin EC (ECOTRIN) 81 MG TBEC Take 81 mg by mouth every day.       No current facility-administered medications for this visit.             Current supplements  as per medication list.       Allergies: Cipro xr    Current social contact/activities:    Goes out to eat with her , visiting friends and mother     She  reports that she quit smoking about 21 years ago. Her smoking use included Cigarettes. She has a 60.00 pack-year smoking history. She has never used smokeless tobacco. She reports that she drinks alcohol. She reports that she does not use drugs.  Counseling given: Not Answered    DPA/Advanced Directive:  Patient has Living Will, but it is not on file. Instructed to bring in a copy to scan into their chart.    ROS:    Gait: Uses no assistive device   Ostomy: no   Other tubes: no   Amputations: no   Chronic oxygen use: no   Last eye exam: 2 years ago   : Denies any urinary leakage during the last 6 months incontinence.       Screening:    Depression Screening    Little interest or pleasure in doing things?  0 - not at all  Feeling down, depressed , or hopeless? 0 - not at all  Patient Health Questionnaire Score: 0     If depressive symptoms identified deferred to follow up visit unless specifically addressed in assessment and plan.    Interpretation of PHQ-9 Total Score   Score Severity   1-4 No Depression   5-9 Mild Depression   10-14 Moderate Depression   15-19 Moderately Severe Depression   20-27 Severe Depression    Screening for Cognitive Impairment    Three Minute Recall (apple, watch, kim)  3/3    Draw clock face with all 12 numbers set to the hand to show 10 minutes past 11 o'clock  1    Cognitive concerns identified deferred for follow up unless specifically addressed in assessment and plan.    Fall Risk Assessment    Has the patient had two or more falls in the last year or any fall with injury in the last year?  No    Safety Assessment    Throw rugs on floor.  No  Handrails on all stairs.  No  Good lighting in all hallways.  Yes  Difficulty hearing.  No  Patient counseled about all safety risks that were identified.    Functional Assessment  ADLs    Are there any barriers preventing you from cooking for yourself or meeting nutritional needs?  No.    Are there any barriers preventing you from driving safely or obtaining transportation?  No.    Are there any barriers preventing you from using a telephone or calling for help?  No.    Are there any barriers preventing you from shopping?  No.    Are there any barriers preventing you from taking care of your own finances?  No.    Are there any barriers preventing you from managing your medications?  No.    Are currently engaging any exercise or physical activity?  Yes.  Walk twice daily for 20 minutes    Health Maintenance Summary                Annual Wellness Visit Overdue 1950     IMM DTaP/Tdap/Td Vaccine Overdue 11/21/1969     MAMMOGRAM Overdue 11/21/1990     COLONOSCOPY Overdue 11/21/2000     IMM ZOSTER VACCINE Overdue 11/21/2010     BONE DENSITY Overdue 11/21/2015     IMM INFLUENZA Overdue 9/1/2017      Done 9/29/2016 Imm Admin: Influenza TIV (IM)     Patient has more history with this topic...    IMM PNEUMOCOCCAL 65+ (ADULT) LOW/MEDIUM RISK SERIES Overdue 9/29/2017      Done 9/29/2016 Imm Admin: Pneumococcal Conjugate Vaccine (Prevnar/PCV-13)     Patient has more history with this topic...          Patient Care Team:  GILBERT Harry as PCP - General (Family Medicine)  Sarath Álvarez M.D. as Consulting Physician (Cardiology)  KELLY Newell as Consulting Physician (Dermatology)      Social History   Substance Use Topics   • Smoking status: Former Smoker     Packs/day: 2.00     Years: 30.00     Types: Cigarettes     Quit date: 4/21/1996   • Smokeless tobacco: Never Used      Comment: 2 packs a day for at least 30 years    • Alcohol use 0.0 oz/week      Comment: occ     Family History   Problem Relation Age of Onset   • Non-contributory Mother      basically healthy   • Heart Attack Father 76     MI   • Hypertension Father    • Hyperlipidemia Father    • Diabetes Paternal  "Grandfather 80     Type II Diabetes     She  has a past medical history of Aortic stenosis (4/22/2014); Atrial fibrillation (CMS-HCC) (4/24/2014); Elevated BP (4/30/2012); Hypertension; Mixed hyperlipidemia (4/30/2012); and S/P AVR (aortic valve replacement) and aortoplasty (4/24/2014).   Past Surgical History:   Procedure Laterality Date   • AORTIC VALVE REPLACEMENT  4/25/2014    Performed by Lilly Rutledge M.D. at SURGERY [facility]   • WRIST ORIF  9/22/2010    Performed by OSGOOD, PATRICK J at SURGERY [facility]   • TENDON REPAIR  9/22/2010    Performed by OSGOOD, PATRICK J at SURGERY [facility]   • CYSTECTOMY  2007    Bracial Cleft cyst removed at [facility]       Exam:     Blood pressure 130/80, pulse 82, temperature 36.6 °C (97.9 °F), resp. rate 16, height 1.664 m (5' 5.5\"), weight 97.7 kg (215 lb 4.8 oz), SpO2 93 %. Body mass index is 35.28 kg/m².    Hearing good.    Dentition good  Alert, oriented in no acute distress.  Eye contact is good, speech goal directed, affect calm      Assessment and Plan. The following treatment and monitoring plan is recommended:    1. Medicare annual wellness visit, initial  Initial Wellness Visit - Includes PPPS ()   2. Obesity (BMI 35.0-39.9 without comorbidity)  Patient identified as having weight management issue.  Appropriate orders and counseling given.   3. Need for vaccination  TDAP VACCINE =>6YO IM   4. S/P AVR (aortic valve replacement) and aortoplasty  Stable, managed by cards    5. Postmenopausal atrophic vaginitis  Stable without treatment    6. Mixed hyperlipidemia  Uncontrolled, patient refuses statin    7. Health care maintenance       Annual follow up     Services suggested: No services needed at this time     Health Care Screening: Age-appropriate preventive services Medicare covers discussed today and ordered if indicated.  Referrals offered: Community-based lifestyle interventions to reduce health risks and promote self-management and wellness, " fall prevention, nutrition, physical activity, tobacco-use cessation, weight loss, and mental health services as per orders if indicated.    Discussion today about general wellness and lifestyle habits:    · Prevent falls and reduce trip hazards; Cautioned about securing or removing rugs.  · Have a working fire alarm and carbon monoxide detector;   · Engage in regular physical activity and social activities       Follow-up: No Follow-up on file.

## 2018-01-24 NOTE — PROGRESS NOTES
Chief Complaint   Patient presents with   • Annual Exam         HPI:  Deana Doss is a 67 y.o. here for Medicare Annual Wellness Visit   S/P AVR (aortic valve replacement) and aortoplasty  Patient is a 67-year-old female who is status post aortic valve replacement in 2014, doing well, no chest pain, shortness of breath, fatigue, palpitations, syncope. She is following with cardiology, unfortunately Dr. Álvarez has retired, she will establish with cardiology next month. Unfortunately, lipids are not at goal, she is quite adamant about avoiding statin, she is concerned that they cause dementia, we did discuss that there is no literature to support this claim, nevertheless she prefers to avoid statin and would prefer to work on dietary modifications and exercise.    Postmenopausal atrophic vaginitis  Patient does report postmenopausal atrophic vaginitis, this is not bothersome, she is not sexually active.     Mixed hyperlipidemia  As previously noted patient's LDL is not at goal, recommend statin, patient is adamant about avoiding statins, she prefers to manage with lifestyle modifications.    Health care maintenance  Patient adamantly declines mammogram, colonoscopy, if IT. She has had bone density scan, unfortunately do not have these records. She is up-to-date on pneumonia vaccines. She knows vaccine was 5-6 years ago.    Osteopenia determined by x-ray  DEXA results are consistent with osteopenia, recommend vitamin D, calcium, regular weightbearing exercise.        Patient Active Problem List    Diagnosis Date Noted   • Obesity (BMI 35.0-39.9 without comorbidity) ([facility]) 01/24/2018   • Osteopenia determined by x-ray 01/24/2018   • Health care maintenance 01/18/2017   • S/P AVR (aortic valve replacement) and aortoplasty 08/11/2014   • LBBB (left bundle branch block) 08/11/2014   • Postmenopausal atrophic vaginitis 08/16/2012   • Mixed hyperlipidemia 04/30/2012       Current Outpatient Prescriptions   Medication  Sig Dispense Refill   • aspirin EC (ECOTRIN) 81 MG TBEC Take 81 mg by mouth every day.       No current facility-administered medications for this visit.             Current supplements as per medication list.       Allergies: Cipro xr    Current social contact/activities:      She  reports that she quit smoking about 21 years ago. Her smoking use included Cigarettes. She has a 60.00 pack-year smoking history. She has never used smokeless tobacco. She reports that she drinks alcohol. She reports that she does not use drugs.  Counseling given: Not Answered        DPA/Advanced Directive:  Patient has Living Will, but it is not on file. Instructed to bring in a copy to scan into their chart.      ROS:    Gait: Uses no assistive device   Ostomy: no   Other tubes: no   Amputations: no   Chronic oxygen use: no   Last eye exam: 2016   : Denies any urinary leakage during the last 6 months incontinence.       Screening:    Depression Screening    Little interest or pleasure in doing things?  0 - not at all  Feeling down, depressed , or hopeless? 0 - not at all  Patient Health Questionnaire Score: 0     If depressive symptoms identified deferred to follow up visit unless specifically addressed in assessment and plan.    Interpretation of PHQ-9 Total Score   Score Severity   1-4 No Depression   5-9 Mild Depression   10-14 Moderate Depression   15-19 Moderately Severe Depression   20-27 Severe Depression    Screening for Cognitive Impairment    Three Minute Recall (apple, watch, kim)  3/3    Draw clock face with all 12 numbers set to the hand to show 10 minutes past 11 o'clock  1    Cognitive concerns identified deferred for follow up unless specifically addressed in assessment and plan.    Fall Risk Assessment    Has the patient had two or more falls in the last year or any fall with injury in the last year?  No    Safety Assessment    Throw rugs on floor.  No  Handrails on all stairs.  No  Good lighting in all hallways.   Yes  Difficulty hearing.  No  Patient counseled about all safety risks that were identified.    Functional Assessment ADLs    Are there any barriers preventing you from cooking for yourself or meeting nutritional needs?  No.    Are there any barriers preventing you from driving safely or obtaining transportation?  No.    Are there any barriers preventing you from using a telephone or calling for help?  No.    Are there any barriers preventing you from shopping?  No.    Are there any barriers preventing you from taking care of your own finances?  No.    Are there any barriers preventing you from managing your medications?  No.    Are currently engaging any exercise or physical activity?  Yes.  Walk twice daily for 20 minutes    Health Maintenance Summary                IMM DTaP/Tdap/Td Vaccine Overdue 11/21/1969     BONE DENSITY Overdue 11/21/2015     MAMMOGRAM Postponed 1/1/2020 Originally 11/21/1990. Patient Refused    COLONOSCOPY Postponed 1/1/2020 Originally 11/21/2000. Patient Refused          Patient Care Team:  GILBERT Harry as PCP - General (Family Medicine)  Sarath Álvarez M.D. as Consulting Physician (Cardiology)  KELLY Newell as Consulting Physician (Dermatology)      Social History   Substance Use Topics   • Smoking status: Former Smoker     Packs/day: 2.00     Years: 30.00     Types: Cigarettes     Quit date: 4/21/1996   • Smokeless tobacco: Never Used      Comment: 2 packs a day for at least 30 years    • Alcohol use 0.0 oz/week      Comment: occ     Family History   Problem Relation Age of Onset   • Non-contributory Mother      basically healthy   • Heart Attack Father 76     MI   • Hypertension Father    • Hyperlipidemia Father    • Diabetes Paternal Grandfather 80     Type II Diabetes     She  has a past medical history of Aortic stenosis (4/22/2014); Atrial fibrillation (CMS-[facility]) (4/24/2014); Elevated BP (4/30/2012); Hypertension; Mixed hyperlipidemia (4/30/2012); and S/P AVR  "(aortic valve replacement) and aortoplasty (4/24/2014).   Past Surgical History:   Procedure Laterality Date   • AORTIC VALVE REPLACEMENT  4/25/2014    Performed by Lilly Rutledge M.D. at SURGERY [facility]   • WRIST ORIF  9/22/2010    Performed by OSGOOD, PATRICK J at SURGERY [facility]   • TENDON REPAIR  9/22/2010    Performed by OSGOOD, PATRICK J at SURGERY [facility]   • CYSTECTOMY  2007    Bracial Cleft cyst removed at [facility]       Exam:     Blood pressure 130/80, pulse 82, temperature 36.6 °C (97.9 °F), resp. rate 16, height 1.664 m (5' 5.5\"), weight 97.7 kg (215 lb 4.8 oz), SpO2 93 %. Body mass index is 35.28 kg/m².    Hearing good.    Dentition good  Alert, oriented in no acute distress.  Eye contact is good, speech goal directed, affect calm      Assessment and Plan. The following treatment and monitoring plan is recommended:    1. Medicare annual wellness visit, initial  Initial Wellness Visit - Includes PPPS ()   2. Obesity (BMI 35.0-39.9 without comorbidity)  Patient identified as having weight management issue.  Appropriate orders and counseling given.   3. Need for vaccination  TDAP VACCINE =>8YO IM   4. S/P AVR (aortic valve replacement) and aortoplasty     5. Postmenopausal atrophic vaginitis     6. Mixed hyperlipidemia     7. Health care maintenance     8. Osteopenia determined by x-ray           Services suggested: No services needed at this time  Health Care Screening: Age-appropriate preventive services Medicare covers discussed today and ordered if indicated.  Referrals offered: Community-based lifestyle interventions to reduce health risks and promote self-management and wellness, fall prevention, nutrition, physical activity, tobacco-use cessation, weight loss, and mental health services as per orders if indicated.    Discussion today about general wellness and lifestyle habits:    · Prevent falls and reduce trip hazards; Cautioned about securing or removing rugs.  · Have a " working fire alarm and carbon monoxide detector;   · Engage in regular physical activity and social activities       Follow-up: No Follow-up on file.

## 2018-01-24 NOTE — ASSESSMENT & PLAN NOTE
Patient is a 67-year-old female who is status post aortic valve replacement in 2014, doing well, no chest pain, shortness of breath, fatigue, palpitations, syncope. She is following with cardiology, unfortunately Dr. Álvarez has retired, she will establish with cardiology next month. Unfortunately, lipids are not at goal, she is quite adamant about avoiding statin, she is concerned that they cause dementia, we did discuss that there is no literature to support this claim, nevertheless she prefers to avoid statin and would prefer to work on dietary modifications and exercise.

## 2018-01-24 NOTE — TELEPHONE ENCOUNTER
Please let patient know that her bone scan shows moderately decreased bone density consistent with osteopenia. She needs to be taking vitamin D and calcium daily as well as getting regular weightbearing exercise.

## 2018-01-24 NOTE — ASSESSMENT & PLAN NOTE
Patient adamantly declines mammogram, colonoscopy, if IT. She has had bone density scan, unfortunately do not have these records. She is up-to-date on pneumonia vaccines. She knows vaccine was 5-6 years ago.

## 2018-01-24 NOTE — ASSESSMENT & PLAN NOTE
Patient does report postmenopausal atrophic vaginitis, this is not bothersome, she is not sexually active.

## 2018-01-24 NOTE — ASSESSMENT & PLAN NOTE
As previously noted patient's LDL is not at goal, recommend statin, patient is adamant about avoiding statins, she prefers to manage with lifestyle modifications.

## 2018-01-25 ENCOUNTER — OFFICE VISIT (OUTPATIENT)
Dept: CARDIOLOGY | Facility: PHYSICIAN GROUP | Age: 68
End: 2018-01-25
Payer: MEDICARE

## 2018-01-25 VITALS
WEIGHT: 215 LBS | BODY MASS INDEX: 34.55 KG/M2 | HEART RATE: 84 BPM | OXYGEN SATURATION: 94 % | DIASTOLIC BLOOD PRESSURE: 82 MMHG | HEIGHT: 66 IN | SYSTOLIC BLOOD PRESSURE: 138 MMHG

## 2018-01-25 DIAGNOSIS — E78.2 MIXED HYPERLIPIDEMIA: ICD-10-CM

## 2018-01-25 DIAGNOSIS — Z95.2 S/P AVR (AORTIC VALVE REPLACEMENT) AND AORTOPLASTY: ICD-10-CM

## 2018-01-25 PROCEDURE — 99214 OFFICE O/P EST MOD 30 MIN: CPT | Performed by: NURSE PRACTITIONER

## 2018-01-25 ASSESSMENT — ENCOUNTER SYMPTOMS
LOSS OF CONSCIOUSNESS: 0
HEADACHES: 0
SHORTNESS OF BREATH: 0
MYALGIAS: 0
DIZZINESS: 0
NAUSEA: 0
ABDOMINAL PAIN: 0
PALPITATIONS: 0
BRUISES/BLEEDS EASILY: 0
ORTHOPNEA: 0
PND: 0
CHILLS: 0
FEVER: 0

## 2018-01-25 NOTE — PROGRESS NOTES
Subjective:   Deana Doss is a 67 y.o. female who presents today for follow-up of AVR and hyperlipidemia.    Deana is a 67 year old female with history of AVR in 2014 with some arash-operative atrial fibrillation, but none since; she also has hyperlipidemia, but declines statins (or other medical therapy).    She is here today for annual follow-up. She states she feels great. No chest pain, pressure or discomfort; no palpitations; no shortness of breath, orthopnea or PND; no dizziness or syncope; no edema. BP is good. She continues to work on diet and exercise for management of her lipids.    Past Medical History:   Diagnosis Date   • Aortic stenosis 4/22/2014   • Atrial fibrillation ([facility]) 4/24/2014    Transient, perioperative and not recurrent   • Elevated BP    • Hypertension    • Mixed hyperlipidemia    • S/P AVR (aortic valve replacement) and aortoplasty 4/24/2014    Dr. Rutledge,  23 mm [facility] Magna pericardial valve, aortic root enlargement (1.5 cm CorMatrix patch),        Past Surgical History:   Procedure Laterality Date   • AORTIC VALVE REPLACEMENT  4/25/2014    Performed by Lilly Rutledge M.D. at SURGERY [facility]   • WRIST ORIF  9/22/2010    Performed by OSGOOD, PATRICK J at SURGERY [facility]   • TENDON REPAIR  9/22/2010    Performed by OSGOOD, PATRICK J at SURGERY [facility]   • CYSTECTOMY  2007    Bracial Cleft cyst removed at [facility]     Family History   Problem Relation Age of Onset   • Non-contributory Mother      basically healthy   • Heart Attack Father 76     MI   • Hypertension Father    • Hyperlipidemia Father    • Diabetes Paternal Grandfather 80     Type II Diabetes     History   Smoking Status   • Former Smoker   • Packs/day: 2.00   • Years: 30.00   • Types: Cigarettes   • Quit date: 4/21/1996   Smokeless Tobacco   • Never Used     Comment: 2 packs a day for at least 30 years      Allergies   Allergen Reactions   • Cipro Xr Hives     Outpatient Encounter  "Prescriptions as of 1/25/2018   Medication Sig Dispense Refill   • aspirin EC (ECOTRIN) 81 MG TBEC Take 81 mg by mouth every day.       No facility-administered encounter medications on file as of 1/25/2018.      Review of Systems   Constitutional: Negative for chills and fever.   Respiratory: Negative for shortness of breath.    Cardiovascular: Negative for chest pain, palpitations, orthopnea, leg swelling and PND.   Gastrointestinal: Negative for abdominal pain and nausea.   Musculoskeletal: Negative for myalgias.   Neurological: Negative for dizziness, loss of consciousness and headaches.   Endo/Heme/Allergies: Does not bruise/bleed easily.        Objective:   /82   Pulse 84   Ht 1.676 m (5' 6\")   Wt 97.5 kg (215 lb)   SpO2 94%   BMI 34.70 kg/m²     Physical Exam   Constitutional: She is oriented to person, place, and time. She appears well-developed and well-nourished. No distress.   HENT:   Head: Normocephalic.   Eyes: Conjunctivae and EOM are normal.   Neck: Normal range of motion. Neck supple. No JVD present. No thyromegaly present.   Cardiovascular: Normal rate, regular rhythm and intact distal pulses.  Exam reveals no gallop and no friction rub.    Murmur heard.   Systolic murmur is present with a grade of 2/6   Murmur at RUSB.   Pulmonary/Chest: Effort normal and breath sounds normal. No respiratory distress.   Abdominal: Soft. Bowel sounds are normal. There is no tenderness.   Musculoskeletal: Normal range of motion. She exhibits no edema.   Neurological: She is alert and oriented to person, place, and time.   Skin: Skin is warm and dry. No rash noted. No erythema.   Psychiatric: She has a normal mood and affect. Her behavior is normal.     Lab Results   Component Value Date/Time    CHOLSTRLTOT 223 (H) 01/16/2018 07:40 AM     (H) 01/16/2018 07:40 AM    HDL 60 01/16/2018 07:40 AM    TRIGLYCERIDE 107 01/16/2018 07:40 AM       Lab Results   Component Value Date/Time    SODIUM 141 01/16/2018 " 07:40 AM    POTASSIUM 4.8 01/16/2018 07:40 AM    CHLORIDE 104 01/16/2018 07:40 AM    CO2 29 01/16/2018 07:40 AM    GLUCOSE 81 01/16/2018 07:40 AM    BUN 14 01/16/2018 07:40 AM    CREATININE 0.85 01/16/2018 07:40 AM     Lab Results   Component Value Date/Time    ALKPHOSPHAT 87 01/16/2018 07:40 AM    ASTSGOT 19 01/16/2018 07:40 AM    ALTSGPT 11 01/16/2018 07:40 AM    TBILIRUBIN 0.7 01/16/2018 07:40 AM        Assessment:     1. S/P AVR (aortic valve replacement) and aortoplasty     2. Mixed hyperlipidemia         Medical Decision Making:  Today's Assessment / Status / Plan:     1. History of AS status post AVR in 2014, doing well. We will repeat echo next year.    2. Hyperlipidemia, not currently on any therapy. Lipid panel is pretty stable. Discussed statins again, and she declines. Work on diet and exercise.    Continue with ASA only, along with lifestyle modifications. Follow-up annually; follow-up sooner if clinical condition changes. Repeat echo next year.    Keep Follow-up with other providers too.    Collaborating MD: TIARA Martinez

## 2018-01-25 NOTE — LETTER
[facility] [address]  Phone: [phone]  Fax: [phone]              Deana Doss  1950    Encounter Date: 1/25/2018    DAHIANA Ferreira          PROGRESS NOTE:  Subjective:   Deana Doss is a 67 y.o. female who presents today for follow-up of AVR and hyperlipidemia.    Deana is a 67 year old female with history of AVR in 2014 with some arash-operative atrial fibrillation, but none since; she also has hyperlipidemia, but declines statins (or other medical therapy).    She is here today for annual follow-up. She states she feels great. No chest pain, pressure or discomfort; no palpitations; no shortness of breath, orthopnea or PND; no dizziness or syncope; no edema. BP is good. She continues to work on diet and exercise for management of her lipids.    Past Medical History:   Diagnosis Date   • Aortic stenosis 4/22/2014   • Atrial fibrillation (CMS-HCC) 4/24/2014    Transient, perioperative and not recurrent   • Elevated BP    • Hypertension    • Mixed hyperlipidemia    • S/P AVR (aortic valve replacement) and aortoplasty 4/24/2014    Dr. Rutledge,  23 mm Hurst Perimount Magna pericardial valve, aortic root enlargement (1.5 cm CorMatrix patch),        Past Surgical History:   Procedure Laterality Date   • AORTIC VALVE REPLACEMENT  4/25/2014    Performed by Lilly Rutledge M.D. at SURGERY [facility]   • WRIST ORIF  9/22/2010    Performed by OSGOOD, PATRICK J at SURGERY [facility]   • TENDON REPAIR  9/22/2010    Performed by OSGOOD, PATRICK J at SURGERY [facility]   • CYSTECTOMY  2007    Bracial Cleft cyst removed at [facility]     Family History   Problem Relation Age of Onset   • Non-contributory Mother      basically healthy   • Heart Attack Father 76     MI   • Hypertension Father    • Hyperlipidemia Father    • Diabetes Paternal Grandfather 80     Type II Diabetes     History   Smoking Status   •  "Former Smoker   • Packs/day: 2.00   • Years: 30.00   • Types: Cigarettes   • Quit date: 4/21/1996   Smokeless Tobacco   • Never Used     Comment: 2 packs a day for at least 30 years      Allergies   Allergen Reactions   • Cipro Xr Hives     Outpatient Encounter Prescriptions as of 1/25/2018   Medication Sig Dispense Refill   • aspirin EC (ECOTRIN) 81 MG TBEC Take 81 mg by mouth every day.       No facility-administered encounter medications on file as of 1/25/2018.      Review of Systems   Constitutional: Negative for chills and fever.   Respiratory: Negative for shortness of breath.    Cardiovascular: Negative for chest pain, palpitations, orthopnea, leg swelling and PND.   Gastrointestinal: Negative for abdominal pain and nausea.   Musculoskeletal: Negative for myalgias.   Neurological: Negative for dizziness, loss of consciousness and headaches.   Endo/Heme/Allergies: Does not bruise/bleed easily.        Objective:   /82   Pulse 84   Ht 1.676 m (5' 6\")   Wt 97.5 kg (215 lb)   SpO2 94%   BMI 34.70 kg/m²      Physical Exam   Constitutional: She is oriented to person, place, and time. She appears well-developed and well-nourished. No distress.   HENT:   Head: Normocephalic.   Eyes: Conjunctivae and EOM are normal.   Neck: Normal range of motion. Neck supple. No JVD present. No thyromegaly present.   Cardiovascular: Normal rate, regular rhythm and intact distal pulses.  Exam reveals no gallop and no friction rub.    Murmur heard.   Systolic murmur is present with a grade of 2/6   Murmur at RUSB.   Pulmonary/Chest: Effort normal and breath sounds normal. No respiratory distress.   Abdominal: Soft. Bowel sounds are normal. There is no tenderness.   Musculoskeletal: Normal range of motion. She exhibits no edema.   Neurological: She is alert and oriented to person, place, and time.   Skin: Skin is warm and dry. No rash noted. No erythema.   Psychiatric: She has a normal mood and affect. Her behavior is normal. "     Lab Results   Component Value Date/Time    CHOLSTRLTOT 223 (H) 01/16/2018 07:40 AM     (H) 01/16/2018 07:40 AM    HDL 60 01/16/2018 07:40 AM    TRIGLYCERIDE 107 01/16/2018 07:40 AM       Lab Results   Component Value Date/Time    SODIUM 141 01/16/2018 07:40 AM    POTASSIUM 4.8 01/16/2018 07:40 AM    CHLORIDE 104 01/16/2018 07:40 AM    CO2 29 01/16/2018 07:40 AM    GLUCOSE 81 01/16/2018 07:40 AM    BUN 14 01/16/2018 07:40 AM    CREATININE 0.85 01/16/2018 07:40 AM     Lab Results   Component Value Date/Time    ALKPHOSPHAT 87 01/16/2018 07:40 AM    ASTSGOT 19 01/16/2018 07:40 AM    ALTSGPT 11 01/16/2018 07:40 AM    TBILIRUBIN 0.7 01/16/2018 07:40 AM        Assessment:     1. S/P AVR (aortic valve replacement) and aortoplasty     2. Mixed hyperlipidemia         Medical Decision Making:  Today's Assessment / Status / Plan:     1. History of AS status post AVR in 2014, doing well. We will repeat echo next year.    2. Hyperlipidemia, not currently on any therapy. Lipid panel is pretty stable. Discussed statins again, and she declines. Work on diet and exercise.    Continue with ASA only, along with lifestyle modifications. Follow-up annually; follow-up sooner if clinical condition changes. Repeat echo next year.    Keep Follow-up with other providers too.    Collaborating MD: TIARA Martinez      No Recipients

## 2018-01-25 NOTE — TELEPHONE ENCOUNTER
Pt said that she has been taking Calcium+ D for years.  She said it is 600 mg of calcium and 250 mg of D.  Do you want her to up the dose?  Please advise

## 2019-01-22 ENCOUNTER — TELEPHONE (OUTPATIENT)
Dept: MEDICAL GROUP | Facility: PHYSICIAN GROUP | Age: 69
End: 2019-01-22

## 2019-01-22 DIAGNOSIS — E78.2 MIXED HYPERLIPIDEMIA: ICD-10-CM

## 2019-01-22 DIAGNOSIS — E53.8 VITAMIN B 12 DEFICIENCY: ICD-10-CM

## 2019-01-22 DIAGNOSIS — E55.9 VITAMIN D INSUFFICIENCY: ICD-10-CM

## 2019-01-22 NOTE — TELEPHONE ENCOUNTER
1. Caller Name: Pt                      Call Back Number: [phone] (home)     2. Message: Pt came into the clinic today for a blood draw but now labs have been ordered since 1/16/18. Pt is to have an OV 1/28/19, do you want her to have labs prior to OV. Please advise.    3. Patient approves office to leave a detailed voicemail/MyChart message: yes

## 2019-01-23 ENCOUNTER — HOSPITAL ENCOUNTER (OUTPATIENT)
Dept: LAB | Facility: MEDICAL CENTER | Age: 69
End: 2019-01-23
Attending: NURSE PRACTITIONER
Payer: MEDICARE

## 2019-01-23 DIAGNOSIS — E53.8 VITAMIN B 12 DEFICIENCY: ICD-10-CM

## 2019-01-23 DIAGNOSIS — E55.9 VITAMIN D INSUFFICIENCY: ICD-10-CM

## 2019-01-23 DIAGNOSIS — E78.2 MIXED HYPERLIPIDEMIA: ICD-10-CM

## 2019-01-23 LAB
25(OH)D3 SERPL-MCNC: 17 NG/ML (ref 30–100)
ALBUMIN SERPL BCP-MCNC: 4.4 G/DL (ref 3.2–4.9)
ALBUMIN/GLOB SERPL: 1.4 G/DL
ALP SERPL-CCNC: 99 U/L (ref 30–99)
ALT SERPL-CCNC: 12 U/L (ref 2–50)
ANION GAP SERPL CALC-SCNC: 7 MMOL/L (ref 0–11.9)
AST SERPL-CCNC: 17 U/L (ref 12–45)
BILIRUB SERPL-MCNC: 0.9 MG/DL (ref 0.1–1.5)
BUN SERPL-MCNC: 14 MG/DL (ref 8–22)
CALCIUM SERPL-MCNC: 9.9 MG/DL (ref 8.5–10.5)
CHLORIDE SERPL-SCNC: 103 MMOL/L (ref 96–112)
CHOLEST SERPL-MCNC: 230 MG/DL (ref 100–199)
CO2 SERPL-SCNC: 30 MMOL/L (ref 20–33)
CREAT SERPL-MCNC: 0.83 MG/DL (ref 0.5–1.4)
ERYTHROCYTE [DISTWIDTH] IN BLOOD BY AUTOMATED COUNT: 43.4 FL (ref 35.9–50)
GLOBULIN SER CALC-MCNC: 3.1 G/DL (ref 1.9–3.5)
GLUCOSE SERPL-MCNC: 84 MG/DL (ref 65–99)
HCT VFR BLD AUTO: 46.8 % (ref 37–47)
HDLC SERPL-MCNC: 62 MG/DL
HGB BLD-MCNC: 14.9 G/DL (ref 12–16)
LDLC SERPL CALC-MCNC: 145 MG/DL
MCH RBC QN AUTO: 29.7 PG (ref 27–33)
MCHC RBC AUTO-ENTMCNC: 31.8 G/DL (ref 33.6–35)
MCV RBC AUTO: 93.4 FL (ref 81.4–97.8)
PLATELET # BLD AUTO: 230 K/UL (ref 164–446)
PMV BLD AUTO: 11.6 FL (ref 9–12.9)
POTASSIUM SERPL-SCNC: 4.3 MMOL/L (ref 3.6–5.5)
PROT SERPL-MCNC: 7.5 G/DL (ref 6–8.2)
RBC # BLD AUTO: 5.01 M/UL (ref 4.2–5.4)
SODIUM SERPL-SCNC: 140 MMOL/L (ref 135–145)
TRIGL SERPL-MCNC: 113 MG/DL (ref 0–149)
WBC # BLD AUTO: 8.3 K/UL (ref 4.8–10.8)

## 2019-01-23 PROCEDURE — 80061 LIPID PANEL: CPT

## 2019-01-23 PROCEDURE — 36415 COLL VENOUS BLD VENIPUNCTURE: CPT

## 2019-01-23 PROCEDURE — 80053 COMPREHEN METABOLIC PANEL: CPT

## 2019-01-23 PROCEDURE — 85027 COMPLETE CBC AUTOMATED: CPT

## 2019-01-23 PROCEDURE — 82306 VITAMIN D 25 HYDROXY: CPT

## 2019-01-28 ENCOUNTER — OFFICE VISIT (OUTPATIENT)
Dept: MEDICAL GROUP | Facility: PHYSICIAN GROUP | Age: 69
End: 2019-01-28
Payer: MEDICARE

## 2019-01-28 VITALS
RESPIRATION RATE: 12 BRPM | DIASTOLIC BLOOD PRESSURE: 78 MMHG | WEIGHT: 216.8 LBS | TEMPERATURE: 97.3 F | OXYGEN SATURATION: 98 % | HEART RATE: 75 BPM | BODY MASS INDEX: 34.84 KG/M2 | HEIGHT: 66 IN | SYSTOLIC BLOOD PRESSURE: 124 MMHG

## 2019-01-28 DIAGNOSIS — Z00.00 MEDICARE ANNUAL WELLNESS VISIT, SUBSEQUENT: Primary | ICD-10-CM

## 2019-01-28 DIAGNOSIS — M85.80 OSTEOPENIA DETERMINED BY X-RAY: ICD-10-CM

## 2019-01-28 DIAGNOSIS — Z95.2 S/P AVR (AORTIC VALVE REPLACEMENT) AND AORTOPLASTY: ICD-10-CM

## 2019-01-28 DIAGNOSIS — Z00.00 HEALTH CARE MAINTENANCE: ICD-10-CM

## 2019-01-28 DIAGNOSIS — E78.2 MIXED HYPERLIPIDEMIA: ICD-10-CM

## 2019-01-28 PROCEDURE — G0439 PPPS, SUBSEQ VISIT: HCPCS | Performed by: NURSE PRACTITIONER

## 2019-01-28 ASSESSMENT — ACTIVITIES OF DAILY LIVING (ADL): BATHING_REQUIRES_ASSISTANCE: 0

## 2019-01-28 ASSESSMENT — ENCOUNTER SYMPTOMS: GENERAL WELL-BEING: GOOD

## 2019-01-28 ASSESSMENT — PATIENT HEALTH QUESTIONNAIRE - PHQ9: CLINICAL INTERPRETATION OF PHQ2 SCORE: 0

## 2019-01-28 NOTE — ASSESSMENT & PLAN NOTE
Patient is a 68-year-old female who is status post aortic valve replacement in 2014.  She continues on daily ASA 81 mg.  She is following with cardiology annually.  No syncope, palpitations, chest pain, shortness of breath.

## 2019-01-28 NOTE — PROGRESS NOTES
Chief Complaint   Patient presents with   • Annual Exam         HPI:  Deana Doss is a 68 y.o. here for Medicare Annual Wellness Visit     S/P AVR (aortic valve replacement) and aortoplasty  Patient is a 68-year-old female who is status post aortic valve replacement in 2014.  She continues on daily ASA 81 mg.  She is following with cardiology annually.  No syncope, palpitations, chest pain, shortness of breath.    Mixed hyperlipidemia  Patient is a 68-year-old female with dyslipidemia, we reviewed labs today, she does have an 8.1% 10-year risk according to ACC/AHA.  She is quite adamant about avoiding statins.    Osteopenia determined by x-ray  Patient is a 68-year-old female who was noted to have osteopenia on DEXA in 2017.  She does continue on vitamin D and calcium.  She declines follow-up DEXA.    Health care maintenance  USPS TF age-appropriate recommendations were discussed with the patient, she declines mammogram, colonoscopy, DEXA.  She has had 1 of 2 she drinks vaccine.  Otherwise she is up-to-date on immunizations.        Patient Active Problem List    Diagnosis Date Noted   • S/P AVR (aortic valve replacement) and aortoplasty 08/11/2014     Priority: High   • Mixed hyperlipidemia 04/30/2012     Priority: High   • Obesity (BMI 35.0-39.9 without comorbidity) (HCC) 01/24/2018     Priority: Medium   • LBBB (left bundle branch block) 08/11/2014     Priority: Medium   • Osteopenia determined by x-ray 01/24/2018   • Health care maintenance 01/18/2017       Current Outpatient Prescriptions   Medication Sig Dispense Refill   • aspirin EC (ECOTRIN) 81 MG TBEC Take 81 mg by mouth every day.       No current facility-administered medications for this visit.             Current supplements as per medication list.       Allergies: Cipro xr    Current social contact/activities:      She  reports that she quit smoking about 22 years ago. Her smoking use included Cigarettes. She has a 60.00 pack-year smoking history.  She has never used smokeless tobacco. She reports that she drinks alcohol. She reports that she does not use drugs.  Counseling given: Not Answered      DPA/Advanced Directive:  Patient has Advanced Directive, but it is not on file. Instructed to bring in a copy to scan into their chart.    ROS:    Gait: Uses no assistive device  Ostomy: No  Other tubes: No  Amputations: No  Chronic oxygen use: No  Last eye exam: 3 years  Wears hearing aids: No   : Denies any urinary leakage during the last 6 months    Screening:  Depression Screening    Little interest or pleasure in doing things?  0 - not at all  Feeling down, depressed , or hopeless? 0 - not at all  Patient Health Questionnaire Score: 0     If depressive symptoms identified deferred to follow up visit unless specifically addressed in assessment and plan.    Interpretation of PHQ-9 Total Score   Score Severity   1-4 No Depression   5-9 Mild Depression   10-14 Moderate Depression   15-19 Moderately Severe Depression   20-27 Severe Depression    Screening for Cognitive Impairment    Three Minute Recall (leader, season, table) 2/3    Yuri clock face with all 12 numbers and set the hands to show 10 past 11.  Yes    Cognitive concerns identified deferred for follow up unless specifically addressed in assessment and plan.    Fall Risk Assessment    Has the patient had two or more falls in the last year or any fall with injury in the last year?  No    Safety Assessment    Throw rugs on floor.  No  Handrails on all stairs.  Yes  Good lighting in all hallways.  Yes  Difficulty hearing.  No  Patient counseled about all safety risks that were identified.    Functional Assessment ADLs    Are there any barriers preventing you from cooking for yourself or meeting nutritional needs?  No.    Are there any barriers preventing you from driving safely or obtaining transportation?  No.    Are there any barriers preventing you from using a telephone or calling for help?  No.    Are  there any barriers preventing you from shopping?  No.    Are there any barriers preventing you from taking care of your own finances?  No.    Are there any barriers preventing you from managing your medications?  No.    Are there any barriers preventing you from showering, bathing or dressing yourself?  No.    Are you currently engaging in any exercise or physical activity?  Yes.  Walking twice a day  What is your perception of your health?  Good.      Health Maintenance Summary                IMM ZOSTER VACCINES Overdue 2/26/2013      Done 1/1/2013 Imm Admin: Zoster Vaccine Live (ZVL) (Zostavax)    BONE DENSITY Overdue 11/21/2015     Annual Wellness Visit Overdue 1/25/2019      Done 1/24/2018 Visit Dx: Medicare annual wellness visit, initial    MAMMOGRAM Postponed 1/1/2020 Originally 11/21/1990. Patient Refused    COLONOSCOPY Postponed 1/1/2020 Originally 11/21/2000. Patient Refused    IMM DTaP/Tdap/Td Vaccine Next Due 1/24/2028      Done 1/24/2018 Imm Admin: Tdap Vaccine          Patient Care Team:  GILBERT aHrry as PCP - General (Family Medicine)  Sarath Álvarez M.D. as Consulting Physician (Cardiology)  KELLY Newell as Consulting Physician (Dermatology)        Social History   Substance Use Topics   • Smoking status: Former Smoker     Packs/day: 2.00     Years: 30.00     Types: Cigarettes     Quit date: 4/21/1996   • Smokeless tobacco: Never Used      Comment: 2 packs a day for at least 30 years    • Alcohol use 0.0 oz/week      Comment: occ     Family History   Problem Relation Age of Onset   • Non-contributory Mother         basically healthy   • Heart Attack Father 76        MI   • Hypertension Father    • Hyperlipidemia Father    • Diabetes Paternal Grandfather 80        Type II Diabetes     She  has a past medical history of Aortic stenosis (4/22/2014); Atrial fibrillation (HCC) (4/24/2014); Elevated BP; Hypertension; Mixed hyperlipidemia; and S/P AVR (aortic valve replacement) and  "aortoplasty (4/24/2014).   Past Surgical History:   Procedure Laterality Date   • AORTIC VALVE REPLACEMENT  4/25/2014    Performed by Lilly Rutledge M.D. at SURGERY [facility]   • WRIST ORIF  9/22/2010    Performed by OSGOOD, PATRICK J at SURGERY [facility]   • TENDON REPAIR  9/22/2010    Performed by OSGOOD, PATRICK J at SURGERY [facility]   • CYSTECTOMY  2007    Bracial Cleft cyst removed at [facility]       Exam:   Blood pressure 124/78, pulse 75, temperature 36.3 °C (97.3 °F), resp. rate 12, height 1.676 m (5' 6\"), weight 98.3 kg (216 lb 12.8 oz), SpO2 98 %, not currently breastfeeding. Body mass index is 34.99 kg/m².    Hearing good.    Dentition fair  Alert, oriented in no acute distress.  Eye contact is good, speech goal directed, affect calm    Assessment and Plan. The following treatment and monitoring plan is recommended:   1. Medicare annual wellness visit, subsequent     2. S/P AVR (aortic valve replacement) and aortoplasty     3. Mixed hyperlipidemia     4. Osteopenia determined by x-ray     5. Health care maintenance           Services suggested: No services needed at this time  Health Care Screening: Age-appropriate preventive services recommended by USPTF and ACIP covered by Medicare were discussed today. Services ordered if indicated and agreed upon by the patient.  Referrals offered: Community-based lifestyle interventions to reduce health risks and promote self-management and wellness, fall prevention, nutrition, physical activity, tobacco-use cessation, weight loss, and mental health services as per orders if indicated.    Discussion today about general wellness and lifestyle habits:    · Prevent falls and reduce trip hazards; Cautioned about securing or removing rugs.  · Have a working fire alarm and carbon monoxide detector;   · Engage in regular physical activity and social activities     Follow-up: No Follow-up on file.  "

## 2019-01-28 NOTE — ASSESSMENT & PLAN NOTE
Patient is a 68-year-old female with dyslipidemia, we reviewed labs today, she does have an 8.1% 10-year risk according to ACC/AHA.  She is quite adamant about avoiding statins.

## 2019-01-28 NOTE — ASSESSMENT & PLAN NOTE
Patient is a 68-year-old female who was noted to have osteopenia on DEXA in 2017.  She does continue on vitamin D and calcium.  She declines follow-up DEXA.

## 2019-01-28 NOTE — ASSESSMENT & PLAN NOTE
USPS TF age-appropriate recommendations were discussed with the patient, she declines mammogram, colonoscopy, DEXA.  She has had 1 of 2 she drinks vaccine.  Otherwise she is up-to-date on immunizations.

## 2019-02-07 ENCOUNTER — OFFICE VISIT (OUTPATIENT)
Dept: CARDIOLOGY | Facility: PHYSICIAN GROUP | Age: 69
End: 2019-02-07
Payer: MEDICARE

## 2019-02-07 VITALS
HEIGHT: 66 IN | WEIGHT: 219 LBS | DIASTOLIC BLOOD PRESSURE: 64 MMHG | HEART RATE: 84 BPM | BODY MASS INDEX: 35.2 KG/M2 | SYSTOLIC BLOOD PRESSURE: 118 MMHG | OXYGEN SATURATION: 95 %

## 2019-02-07 DIAGNOSIS — E78.2 MIXED HYPERLIPIDEMIA: ICD-10-CM

## 2019-02-07 DIAGNOSIS — I44.7 LBBB (LEFT BUNDLE BRANCH BLOCK): ICD-10-CM

## 2019-02-07 DIAGNOSIS — Z95.2 S/P AVR (AORTIC VALVE REPLACEMENT) AND AORTOPLASTY: ICD-10-CM

## 2019-02-07 PROBLEM — Z00.00 HEALTH CARE MAINTENANCE: Status: RESOLVED | Noted: 2017-01-18 | Resolved: 2019-02-07

## 2019-02-07 PROCEDURE — 99214 OFFICE O/P EST MOD 30 MIN: CPT | Performed by: INTERNAL MEDICINE

## 2019-02-07 RX ORDER — AMOXICILLIN 500 MG/1
CAPSULE ORAL
COMMUNITY
Start: 2019-01-30 | End: 2019-02-07

## 2019-02-07 RX ORDER — CLINDAMYCIN HYDROCHLORIDE 150 MG/1
CAPSULE ORAL
Refills: 0 | COMMUNITY
Start: 2019-01-04 | End: 2019-02-07

## 2019-02-07 ASSESSMENT — ENCOUNTER SYMPTOMS
DEPRESSION: 0
BRUISES/BLEEDS EASILY: 0
WHEEZING: 0
VOMITING: 0
ABDOMINAL PAIN: 0
LOSS OF CONSCIOUSNESS: 0
CHILLS: 0
BLURRED VISION: 0
SPEECH CHANGE: 0
EYE DISCHARGE: 0
EYE PAIN: 0
HEMOPTYSIS: 0
MYALGIAS: 0
COUGH: 0
PALPITATIONS: 0
FEVER: 0
NAUSEA: 0
NERVOUS/ANXIOUS: 0

## 2019-02-07 NOTE — PROGRESS NOTES
Chief Complaint   Patient presents with   • Hyperlipidemia       Subjective:   Deana Doss is a 68 y.o. female who presents today in follow-up in regards to her aortic valve replacement for congenital aortic stenosis at the age of 62, no coronary disease at that time, perioperative atrial fibrillation hypertension and hyperlipidemia    Doing very well no setbacks, walks twice a day.  Trying to lose weight slowly.  Compliant with her aspirin    Needs a new PCP    Past Medical History:   Diagnosis Date   • Aortic stenosis 4/22/2014   • Atrial fibrillation (HCC) 4/24/2014    Transient, perioperative and not recurrent   • Elevated BP    • Hypertension    • Mixed hyperlipidemia    • S/P AVR (aortic valve replacement) and aortoplasty 4/24/2014    Dr. Rutledge,  23 mm Hurst Perimount Magna pericardial valve, aortic root enlargement (1.5 cm CorMatrix patch),        Past Surgical History:   Procedure Laterality Date   • AORTIC VALVE REPLACEMENT  4/25/2014    Performed by Lilly Rutledge M.D. at SURGERY [facility]   • WRIST ORIF  9/22/2010    Performed by OSGOOD, PATRICK J at SURGERY [facility]   • TENDON REPAIR  9/22/2010    Performed by OSGOOD, PATRICK J at SURGERY [facility]   • CYSTECTOMY  2007    Bracial Cleft cyst removed at [facility]     Family History   Problem Relation Age of Onset   • Non-contributory Mother         basically healthy   • Heart Attack Father 76        MI   • Hypertension Father    • Hyperlipidemia Father    • Diabetes Paternal Grandfather 80        Type II Diabetes     Social History     Social History   • Marital status:      Spouse name: N/A   • Number of children: N/A   • Years of education: N/A     Occupational History   • Not on file.     Social History Main Topics   • Smoking status: Former Smoker     Packs/day: 2.00     Years: 30.00     Types: Cigarettes     Quit date: 4/21/1996   • Smokeless tobacco: Never Used      Comment: 2 packs a day for at least 30 years    •  "Alcohol use 0.0 oz/week      Comment: occ   • Drug use: No   • Sexual activity: Not Currently     Partners: Male     Birth control/ protection: Post-Menopausal      Comment: , retired     Other Topics Concern   • Not on file     Social History Narrative   • No narrative on file     Allergies   Allergen Reactions   • Cipro Xr Hives     Outpatient Encounter Prescriptions as of 2/7/2019   Medication Sig Dispense Refill   • aspirin EC (ECOTRIN) 81 MG TBEC Take 81 mg by mouth every day.     • [DISCONTINUED] amoxicillin (AMOXIL) 500 MG Cap      • [DISCONTINUED] clindamycin (CLEOCIN) 150 MG Cap TK ONE C PO  Q 6 H TAT  0   • [DISCONTINUED] SHINGRIX 50 MCG Recon Susp        No facility-administered encounter medications on file as of 2/7/2019.      Review of Systems   Constitutional: Negative for chills and fever.   HENT: Negative for congestion.    Eyes: Negative for blurred vision, pain and discharge.   Respiratory: Negative for cough, hemoptysis and wheezing.    Cardiovascular: Negative for chest pain and palpitations.   Gastrointestinal: Negative for abdominal pain, nausea and vomiting.   Musculoskeletal: Negative for joint pain and myalgias.   Skin: Negative for itching and rash.   Neurological: Negative for speech change and loss of consciousness.   Endo/Heme/Allergies: Negative for environmental allergies. Does not bruise/bleed easily.   Psychiatric/Behavioral: Negative for depression. The patient is not nervous/anxious.    All other systems reviewed and are negative.       Objective:   /64 (BP Location: Left arm, Patient Position: Sitting, BP Cuff Size: Adult)   Pulse 84   Ht 1.676 m (5' 6\")   Wt 99.3 kg (219 lb)   SpO2 95%   BMI 35.35 kg/m²     Physical Exam   Constitutional: She is oriented to person, place, and time. She appears well-developed and well-nourished.   HENT:   Head: Normocephalic and atraumatic.   Mouth/Throat: Oropharynx is clear and moist.   Eyes: Pupils are equal, round, and " reactive to light. EOM are normal.   Neck: No JVD present. No thyromegaly present.   Cardiovascular: Normal rate, regular rhythm and intact distal pulses.  Exam reveals no gallop.    Murmur (Harsh systolic mid peaking, 2 out of 6) heard.  Pulmonary/Chest: Breath sounds normal. No respiratory distress.   Abdominal: Bowel sounds are normal. She exhibits no distension.   Musculoskeletal: She exhibits no edema or tenderness.   Neurological: She is oriented to person, place, and time. She exhibits normal muscle tone. Coordination normal.   Skin: Skin is warm and dry. No rash noted.   Psychiatric: She has a normal mood and affect. Her behavior is normal.       Assessment:     1. Mixed hyperlipidemia     2. S/P AVR (aortic valve replacement) and aortoplasty     3. LBBB (left bundle branch block)         Medical Decision Making:  Today's Assessment / Status / Plan:     Aortic stenosis  She is new to me.  We talked about aortic stenosis and pathophysiology.  We looked at the images of her echocardiogram done last in 2014, she thinks she might have done one later and I requested echoes from [facility].  Ordered repeat echocardiogram, talked about possibility of needing replacement.  Bioprosthesis    Hypertension/hyperlipidemia  Labs reviewed, she follows with her PCP.  Talked about guidelines at length.  Talked about risk of heart disease in the setting    Atrial fibrillation  Resolved  Rhythm appears normal    Left bundle branch block  Chronic discussed    RTC one year sooner with concerns

## 2019-02-21 DIAGNOSIS — Z95.2 S/P AVR (AORTIC VALVE REPLACEMENT) AND AORTOPLASTY: ICD-10-CM

## 2020-02-13 ENCOUNTER — HOSPITAL ENCOUNTER (OUTPATIENT)
Dept: LAB | Facility: MEDICAL CENTER | Age: 70
End: 2020-02-13
Attending: NURSE PRACTITIONER
Payer: MEDICARE

## 2020-02-13 ENCOUNTER — OFFICE VISIT (OUTPATIENT)
Dept: MEDICAL GROUP | Facility: PHYSICIAN GROUP | Age: 70
End: 2020-02-13
Payer: MEDICARE

## 2020-02-13 VITALS
TEMPERATURE: 97.7 F | DIASTOLIC BLOOD PRESSURE: 70 MMHG | RESPIRATION RATE: 14 BRPM | BODY MASS INDEX: 35.2 KG/M2 | HEART RATE: 79 BPM | SYSTOLIC BLOOD PRESSURE: 130 MMHG | HEIGHT: 66 IN | OXYGEN SATURATION: 93 % | WEIGHT: 219 LBS

## 2020-02-13 DIAGNOSIS — E78.2 MIXED HYPERLIPIDEMIA: ICD-10-CM

## 2020-02-13 DIAGNOSIS — Z95.2 S/P AVR (AORTIC VALVE REPLACEMENT) AND AORTOPLASTY: ICD-10-CM

## 2020-02-13 DIAGNOSIS — Z11.59 NEED FOR HEPATITIS C SCREENING TEST: ICD-10-CM

## 2020-02-13 DIAGNOSIS — Z13.6 SCREENING FOR CARDIOVASCULAR CONDITION: ICD-10-CM

## 2020-02-13 DIAGNOSIS — M85.80 OSTEOPENIA DETERMINED BY X-RAY: ICD-10-CM

## 2020-02-13 DIAGNOSIS — Z11.59 ENCOUNTER FOR HEPATITIS C SCREENING TEST FOR LOW RISK PATIENT: ICD-10-CM

## 2020-02-13 DIAGNOSIS — E55.9 VITAMIN D DEFICIENCY: ICD-10-CM

## 2020-02-13 DIAGNOSIS — Z00.00 HEALTHCARE MAINTENANCE: ICD-10-CM

## 2020-02-13 DIAGNOSIS — E66.9 OBESITY (BMI 35.0-39.9 WITHOUT COMORBIDITY): ICD-10-CM

## 2020-02-13 LAB
ALBUMIN SERPL BCP-MCNC: 4.7 G/DL (ref 3.2–4.9)
ALBUMIN/GLOB SERPL: 1.5 G/DL
ALP SERPL-CCNC: 88 U/L (ref 30–99)
ALT SERPL-CCNC: 11 U/L (ref 2–50)
ANION GAP SERPL CALC-SCNC: 8 MMOL/L (ref 0–11.9)
AST SERPL-CCNC: 16 U/L (ref 12–45)
BILIRUB SERPL-MCNC: 0.8 MG/DL (ref 0.1–1.5)
BUN SERPL-MCNC: 14 MG/DL (ref 8–22)
CALCIUM SERPL-MCNC: 10 MG/DL (ref 8.5–10.5)
CHLORIDE SERPL-SCNC: 105 MMOL/L (ref 96–112)
CHOLEST SERPL-MCNC: 278 MG/DL (ref 100–199)
CO2 SERPL-SCNC: 28 MMOL/L (ref 20–33)
CREAT SERPL-MCNC: 0.88 MG/DL (ref 0.5–1.4)
ERYTHROCYTE [DISTWIDTH] IN BLOOD BY AUTOMATED COUNT: 43.7 FL (ref 35.9–50)
FASTING STATUS PATIENT QL REPORTED: NORMAL
GLOBULIN SER CALC-MCNC: 3.2 G/DL (ref 1.9–3.5)
GLUCOSE SERPL-MCNC: 95 MG/DL (ref 65–99)
HCT VFR BLD AUTO: 48.8 % (ref 37–47)
HDLC SERPL-MCNC: 68 MG/DL
HGB BLD-MCNC: 15.4 G/DL (ref 12–16)
LDLC SERPL CALC-MCNC: 183 MG/DL
MCH RBC QN AUTO: 29.4 PG (ref 27–33)
MCHC RBC AUTO-ENTMCNC: 31.6 G/DL (ref 33.6–35)
MCV RBC AUTO: 93.1 FL (ref 81.4–97.8)
PLATELET # BLD AUTO: 213 K/UL (ref 164–446)
PMV BLD AUTO: 11.6 FL (ref 9–12.9)
POTASSIUM SERPL-SCNC: 4.9 MMOL/L (ref 3.6–5.5)
PROT SERPL-MCNC: 7.9 G/DL (ref 6–8.2)
RBC # BLD AUTO: 5.24 M/UL (ref 4.2–5.4)
SODIUM SERPL-SCNC: 141 MMOL/L (ref 135–145)
TRIGL SERPL-MCNC: 135 MG/DL (ref 0–149)
WBC # BLD AUTO: 8.5 K/UL (ref 4.8–10.8)

## 2020-02-13 PROCEDURE — 99214 OFFICE O/P EST MOD 30 MIN: CPT | Performed by: NURSE PRACTITIONER

## 2020-02-13 PROCEDURE — 86803 HEPATITIS C AB TEST: CPT

## 2020-02-13 PROCEDURE — 80061 LIPID PANEL: CPT

## 2020-02-13 PROCEDURE — 36415 COLL VENOUS BLD VENIPUNCTURE: CPT

## 2020-02-13 PROCEDURE — 80053 COMPREHEN METABOLIC PANEL: CPT

## 2020-02-13 PROCEDURE — 85027 COMPLETE CBC AUTOMATED: CPT

## 2020-02-13 RX ORDER — AMOXICILLIN 500 MG/1
CAPSULE ORAL
COMMUNITY
Start: 2020-02-03 | End: 2020-02-13

## 2020-02-13 ASSESSMENT — PATIENT HEALTH QUESTIONNAIRE - PHQ9: CLINICAL INTERPRETATION OF PHQ2 SCORE: 0

## 2020-02-13 NOTE — ASSESSMENT & PLAN NOTE
This is a chronic health problem that is well controlled with current medications and lifestyle measures.  Had aortic valve replacement 2014.  Continues with daily aspirin 81 mg.  Followed by cardiology annually.  She has an upcoming appointment in 2 weeks.  Denies chest pain, syncope, palpitations, shortness of breath.

## 2020-02-13 NOTE — ASSESSMENT & PLAN NOTE
Patient 68-year-old female here to establish care with me today.  DEXA in 2017 showed to have osteopenia.  Patient takes calcium supplement with vitamin D daily.  Does weightbearing exercises.  Reports if she takes extra vitamin D, she has full body muscle aches.  Next due for DEXA in 2020.

## 2020-02-13 NOTE — ASSESSMENT & PLAN NOTE
Patient 68-year-old female here to establish care with me today.  She has history of dyslipidemia.  ASCVD risk is 8%.  She is quite certain she does not want to take statins.  She does work on lifestyle measures including low-fat diet and exercise.  She walks 2 miles a day 4 days a week and uses her stairmaster on other days.  We will get updated lipid profile.

## 2020-02-13 NOTE — PROGRESS NOTES
CC: Establish care    HISTORY OF THE PRESENT ILLNESS: Patient is a 69 y.o. female. This pleasant patient is here today for evaluation and management of the following health problems. Patient's previous primary care provider is Sandi TREVIÑO.        S/P AVR (aortic valve replacement) and aortoplasty  This is a chronic health problem that is well controlled with current medications and lifestyle measures.  Had aortic valve replacement 2014.  Continues with daily aspirin 81 mg.  Followed by cardiology annually.  She has an upcoming appointment in 2 weeks.  Denies chest pain, syncope, palpitations, shortness of breath.        Mixed hyperlipidemia  Patient 68-year-old female here to establish care with me today.  She has history of dyslipidemia.  ASCVD risk is 8%.  She is quite certain she does not want to take statins.  She does work on lifestyle measures including low-fat diet and exercise.  She walks 2 miles a day 4 days a week and uses her stairmaster on other days.  We will get updated lipid profile.      Osteopenia determined by x-ray  Patient 68-year-old female here to establish care with me today.  DEXA in 2017 showed to have osteopenia.  Patient takes calcium supplement with vitamin D daily.  Does weightbearing exercises.  Reports if she takes extra vitamin D, she has full body muscle aches.  Next due for DEXA in 2020.    Healthcare maintenance  Reviewed recommendations for screening mammogram, colon cancer screening, DEXA scan.  Patient declines all at this time.    Vitamin D deficiency  Chronic health problem.  Patient takes calcium with vitamin D.  She reports that she takes additional vitamin D supplementation, she has worsening joint pain and myalgias.  She is declining lab work to check her vitamin D level.    Component      Latest Ref Rng & Units 1/16/2018 1/23/2019           7:40 AM  7:12 AM   25-Hydroxy   Vitamin D 25      30 - 100 ng/mL 23 (L) 17 (L)       Allergies: Cipro xr    Current Outpatient  Medications Ordered in Epic   Medication Sig Dispense Refill   • aspirin EC (ECOTRIN) 81 MG TBEC Take 81 mg by mouth every day.       No current [facility]-ordered facility-administered medications on file.        Past Medical History:   Diagnosis Date   • Aortic stenosis 2014   • Atrial fibrillation (HCC) 2014    Transient, perioperative and not recurrent   • Elevated BP    • Hypertension    • Mixed hyperlipidemia    • S/P AVR (aortic valve replacement) and aortoplasty 2014    Dr. Rutledge,  23 mm Hurst Perimount Magna pericardial valve, aortic root enlargement (1.5 cm CorMatrix patch),          Past Surgical History:   Procedure Laterality Date   • AORTIC VALVE REPLACEMENT  2014    Performed by Lilly Rutldege M.D. at SURGERY [facility]   • WRIST ORIF  2010    Performed by OSGOOD, PATRICK J at SURGERY [facility]   • TENDON REPAIR  2010    Performed by OSGOOD, PATRICK J at SURGERY [facility]S   • CYSTECTOMY  2007    Bracial Cleft cyst removed at [facility]       Social History     Tobacco Use   • Smoking status: Former Smoker     Packs/day: 2.00     Years: 30.00     Pack years: 60.00     Types: Cigarettes     Last attempt to quit: 1996     Years since quittin.8   • Smokeless tobacco: Never Used   • Tobacco comment: 2 packs a day for at least 30 years    Substance Use Topics   • Alcohol use: Yes     Alcohol/week: 0.0 oz     Comment: occ   • Drug use: No       Family History   Problem Relation Age of Onset   • Non-contributory Mother         basically healthy   • Heart Attack Father 76        MI   • Hypertension Father    • Hyperlipidemia Father    • Diabetes Paternal Grandfather 80        Type II Diabetes       ROS:   As in HPI, otherwise negative for chest pain, dyspnea, abdominal pain, dysuria, blood in stool, fever            Exam: /70 (BP Location: Right arm, Patient Position: Sitting, BP Cuff Size: Adult)   Pulse 79   Temp 36.5 °C (97.7 °F)   Resp 14   Ht  "1.676 m (5' 6\")   Wt 99.3 kg (219 lb)   SpO2 93%  Body mass index is 35.35 kg/m².    General: Alert, pleasant, obese habitus, well nourished, well developed female in NAD  HEENT: Normocephalic. Eyes conjunctiva clear lids without ptosis, pupils equal and reactive to light, ears normal shape and contour, canals are clear bilaterally, tympanic membranes are pearly gray with good light reflex, nasal mucosa without erythema and drainage, oropharynx is without erythema, edema or exudates.   Neck: Supple without bruit. Thyroid is not enlarged.  Pulmonary: Clear to ausculation.  Normal effort. No rales, ronchi, or wheezing.  Cardiovascular: Normal rate and rhythm with murmur. Carotid and radial pulses are intact and equal bilaterally.  No lower extremity edema.  Abdomen: Soft, nontender, nondistended. Normal bowel sounds. Liver and spleen are not palpable  Neurologic: Grossly nonfocal  Lymph: No cervical or supraclavicular lymph nodes are palpable  Skin: Warm and dry.    Musculoskeletal: Normal gait.   Psych: Normal mood and affect. Alert and oriented. Judgment and insight is normal.    Please note that this dictation was created using voice recognition software. I have made every reasonable attempt to correct obvious errors, but I expect that there are errors of grammar and possibly content that I did not discover before finalizing the note.      Assessment/Plan  1. S/P AVR (aortic valve replacement) and aortoplasty  Continue follow-up with cardiology.  Next appointment is later this month.  Will notify patient of lab results at next appointment.  - CBC WITHOUT DIFFERENTIAL; Future    2. Mixed hyperlipidemia  Reviewed ASCVD risk with patient.  She strongly declines starting on statin.  Advised on supplemental fish oil.  Continue with lifestyle measures including routine exercise and low-fat diet.  - Lipid Profile; Future    3. Encounter for hepatitis C screening test for low risk patient      4. Need for hepatitis C " screening test  Advised patient on rationale for hepatitis C screening.  She is in agreement to have this done.  - HEP C VIRUS ANTIBODY; Future    5. Screening for cardiovascular condition  Will notify patient of lab results via [employer].  - Comp Metabolic Panel; Future  - ESTIMATED GFR; Future  - CBC WITHOUT DIFFERENTIAL; Future    6. Osteopenia determined by x-ray  Patient continues to take calcium with vitamin D supplementation and weightbearing exercise.  She is in agreement to have repeat bone density scan in a couple years.    7. Obesity (BMI 35.0-39.9 without comorbidity) (HCC)    - Patient identified as having weight management issue.  Appropriate orders and counseling given.      8. Healthcare maintenance  Continue to review at follow-up appointments.    9. Vitamin D deficiency  Continue with calcium with vitamin D supplementation.  Advised patient on foods high in vitamin D.    Patient will return to clinic annually or sooner if needed.  She is declining annual wellness visit at this time.

## 2020-02-14 LAB — HCV AB SER QL: NEGATIVE

## 2020-02-14 NOTE — ASSESSMENT & PLAN NOTE
Chronic health problem.  Patient takes calcium with vitamin D.  She reports that she takes additional vitamin D supplementation, she has worsening joint pain and myalgias.  She is declining lab work to check her vitamin D level.    Component      Latest Ref Rng & Units 1/16/2018 1/23/2019           7:40 AM  7:12 AM   25-Hydroxy   Vitamin D 25      30 - 100 ng/mL 23 (L) 17 (L)

## 2020-02-14 NOTE — ASSESSMENT & PLAN NOTE
Reviewed recommendations for screening mammogram, colon cancer screening, DEXA scan.  Patient declines all at this time.

## 2020-02-24 ENCOUNTER — OFFICE VISIT (OUTPATIENT)
Dept: CARDIOLOGY | Facility: PHYSICIAN GROUP | Age: 70
End: 2020-02-24
Payer: MEDICARE

## 2020-02-24 VITALS
DIASTOLIC BLOOD PRESSURE: 64 MMHG | BODY MASS INDEX: 35.68 KG/M2 | HEART RATE: 82 BPM | SYSTOLIC BLOOD PRESSURE: 118 MMHG | HEIGHT: 66 IN | WEIGHT: 222 LBS | OXYGEN SATURATION: 93 % | RESPIRATION RATE: 16 BRPM

## 2020-02-24 DIAGNOSIS — I44.7 LBBB (LEFT BUNDLE BRANCH BLOCK): ICD-10-CM

## 2020-02-24 DIAGNOSIS — E78.5 DYSLIPIDEMIA: Chronic | ICD-10-CM

## 2020-02-24 DIAGNOSIS — Z95.2 S/P AVR (AORTIC VALVE REPLACEMENT) AND AORTOPLASTY: ICD-10-CM

## 2020-02-24 PROCEDURE — 99214 OFFICE O/P EST MOD 30 MIN: CPT | Performed by: INTERNAL MEDICINE

## 2020-02-24 ASSESSMENT — ENCOUNTER SYMPTOMS
CHILLS: 0
PND: 0
WEAKNESS: 0
FEVER: 0
BLURRED VISION: 0
SORE THROAT: 0
BRUISES/BLEEDS EASILY: 0
PALPITATIONS: 0
FALLS: 0
DIZZINESS: 0
SHORTNESS OF BREATH: 0
FOCAL WEAKNESS: 0
NAUSEA: 0
CLAUDICATION: 0
ABDOMINAL PAIN: 0
COUGH: 0

## 2020-02-24 NOTE — PATIENT INSTRUCTIONS
Work on at least 1.5 hours a week of moderate exercise (typical brisk walking or similar activity)    Please look into the following diets and incorporate them into your diet    LOW SALT DIET   KEEP YOUR SODIUM EQUAL TO CALORIES AND NO MORE THAN DOUBLE THE CALORIES FOR A LOW SALT DIET    Cardiosmart.org - great resource for American College of Cardiology on heart disease prevention and treatment      FOR TREATMENT OR PREVENTION OF CORONARY ARTERY DISEASE  These three programs are approved by Medicare/Insurers for those with heart disease  [facility] - [facility]  Dr. De La Cruz's Program for Reversing Heart Disease - Jose Alejandro Rao's Cardiologist vegetarian-based  [facility] Program - [place]-based mind-body Program    This is a commonly referenced Program  Dr Zavala - [employer] [facility] [employer] (book and documentary) - vegetarian-based      FOR TREATMENT OF BLOOD PRESSURE  DASH DIET - American Heart Association for treatment of HYPERTENSION    FOR TREATMENT OF BAD CHOLESTEROL/FATS  REDUCE PROCESSED SUGAR AS MUCH AS POSSIBLE  INCREASE WHOLE GRAINS/VEGETABLES    Lowering total cholesterol and LDL (bad) cholesterol:  - Eat leaner cuts of meat, or eliminate altogether if possible red meat, and frequently substitute fish or chicken.  - Limit saturated fat to no more than 7-10% of total calories no more than 10 g per day is recommended. Some sources of saturated fat include butter, animal fats, hydrogenated vegetable fats and oils, many desserts, whole milk dairy products.  - Replaced saturated fats with polyunsaturated fats and monounsaturated fats. Foods high in monounsaturated fat include nuts, although well, canola oil, avocados, and olives.  - Limit trans fat (processed foods) and replaced with fresh fruits and vegetables  - Recommend nonfat dairy products  - Increase substantially the amount of soluble fiber intake (legumes such as beans, fruit, whole grains).  - Consider  nutritional supplements: plant sterile spreads such as Benecol, fish oil,  flaxseed oil, omega-3 acids capsules 1000 mg twice a day, or viscous fiber such as Metamucil  - Attain ideal weight and regular exercise (at least 30 minutes per day of walking)    Lowering triglycerides:  - Reduce intake of simple sugar: Desserts, candy, pastries, honey, sodas, sugared cereals, yogurt, Gatorade, sports bars, canned fruit, smoothies, fruit juice, coffee drinks  - Reduced intake of refined starches: Refined Pasta  - Reduce or abstain from alcohol  - Increase omega-3 fatty acids: [place], Trout, Mackerel, Herring, Albacore tuna and supplements  - Attain ideal weight and regular exercise (at least 30 minutes per day of walking)      Elevating HDL (good) cholesterol:  - Increase physical activity  - Seasoned foods with garlic and onions  - Increase omega-3 fatty acids and supplements as listed above  - Incorporating appropriate amounts of monounsaturated fats such as nuts, olive oil, canola oil, avocados, olives  - Stop smoking  - Attain ideal weight and regular exercise (at least 30 minutes per day of walking)

## 2020-02-25 NOTE — PROGRESS NOTES
Chief Complaint   Patient presents with   • Hyperlipidemia       Subjective:   Deana Doss is a 69 y.o. female who presents today for follow-up of her history of aortic valve replacement in 2014 setting of severe aortic stenosis at that time she had reduced ejection fraction    She is done well last year we repeated echocardiogram shows EF of 40%    No significant arrhythmias she only takes aspirin    Recent lipids are increased she is unsure why    Past Medical History:   Diagnosis Date   • Aortic stenosis 4/22/2014   • Atrial fibrillation (HCC) 4/24/2014    Transient, perioperative and not recurrent   • Elevated BP    • Hypertension    • Mixed hyperlipidemia    • S/P AVR (aortic valve replacement) and aortoplasty 4/24/2014    Dr. Rutledge,  23 mm Hurst Perimount Magna pericardial valve, aortic root enlargement (1.5 cm CorMatrix patch),        Past Surgical History:   Procedure Laterality Date   • AORTIC VALVE REPLACEMENT  4/25/2014    Performed by Lilly Rutledge M.D. at SURGERY [facility] ORS   • WRIST ORIF  9/22/2010    Performed by OSGOOD, PATRICK J at SURGERY [facility] ORS   • TENDON REPAIR  9/22/2010    Performed by OSGOOD, PATRICK J at SURGERY [facility] ORS   • CYSTECTOMY  2007    Bracial Cleft cyst removed at [facility]     Family History   Problem Relation Age of Onset   • Non-contributory Mother         basically healthy   • Heart Attack Father 76        MI   • Hypertension Father    • Hyperlipidemia Father    • Diabetes Paternal Grandfather 80        Type II Diabetes     Social History     Socioeconomic History   • Marital status:      Spouse name: Not on file   • Number of children: Not on file   • Years of education: Not on file   • Highest education level: Not on file   Occupational History   • Not on file   Social Needs   • Financial resource strain: Not on file   • Food insecurity     Worry: Not on file     Inability: Not on file   • Transportation needs     Medical: Not on file      Non-medical: Not on file   Tobacco Use   • Smoking status: Former Smoker     Packs/day: 2.00     Years: 30.00     Pack years: 60.00     Types: Cigarettes     Last attempt to quit: 1996     Years since quittin.8   • Smokeless tobacco: Never Used   • Tobacco comment: 2 packs a day for at least 30 years    Substance and Sexual Activity   • Alcohol use: Yes     Alcohol/week: 0.0 oz     Comment: occ   • Drug use: No   • Sexual activity: Not Currently     Partners: Male     Birth control/protection: Post-Menopausal     Comment: , retired   Lifestyle   • Physical activity     Days per week: Not on file     Minutes per session: Not on file   • Stress: Not on file   Relationships   • Social connections     Talks on phone: Not on file     Gets together: Not on file     Attends Jain service: Not on file     Active member of club or organization: Not on file     Attends meetings of clubs or organizations: Not on file     Relationship status: Not on file   • Intimate partner violence     Fear of current or ex partner: Not on file     Emotionally abused: Not on file     Physically abused: Not on file     Forced sexual activity: Not on file   Other Topics Concern   • Not on file   Social History Narrative   • Not on file     Allergies   Allergen Reactions   • Cipro Xr Hives     Outpatient Encounter Medications as of 2020   Medication Sig Dispense Refill   • aspirin EC (ECOTRIN) 81 MG TBEC Take 81 mg by mouth every day.       No facility-administered encounter medications on file as of 2020.      Review of Systems   Constitutional: Negative for chills and fever.   HENT: Negative for sore throat.    Eyes: Negative for blurred vision.   Respiratory: Negative for cough and shortness of breath.    Cardiovascular: Negative for chest pain, palpitations, claudication, leg swelling and PND.   Gastrointestinal: Negative for abdominal pain and nausea.   Musculoskeletal: Negative for falls and joint pain.  "  Skin: Negative for rash.   Neurological: Negative for dizziness, focal weakness and weakness.   Endo/Heme/Allergies: Does not bruise/bleed easily.        Objective:   /64 (BP Location: Left arm, Patient Position: Sitting, BP Cuff Size: Adult)   Pulse 82   Resp 16   Ht 1.676 m (5' 6\")   Wt 100.7 kg (222 lb)   SpO2 93%   BMI 35.83 kg/m²     Physical Exam   Constitutional: No distress.   HENT:   Mouth/Throat: Oropharynx is clear and moist. No oropharyngeal exudate.   Eyes: No scleral icterus.   Neck: No JVD present.   Cardiovascular: Normal rate. Exam reveals no gallop and no friction rub.   Murmur (e expected) heard.  Pulmonary/Chest: No respiratory distress. She has no wheezes. She has no rales.   Abdominal: Soft. Bowel sounds are normal.   Musculoskeletal:         General: No edema.   Neurological: She is alert.   Skin: No rash noted. She is not diaphoretic.   Psychiatric: She has a normal mood and affect.     We reviewed in person the most recent labs  Recent Results (from the past 4032 hour(s))   HEP C VIRUS ANTIBODY    Collection Time: 02/13/20  9:07 AM   Result Value Ref Range    Hepatitis C Antibody Negative Negative   CBC WITHOUT DIFFERENTIAL    Collection Time: 02/13/20  9:07 AM   Result Value Ref Range    WBC 8.5 4.8 - 10.8 K/uL    RBC 5.24 4.20 - 5.40 M/uL    Hemoglobin 15.4 12.0 - 16.0 g/dL    Hematocrit 48.8 (H) 37.0 - 47.0 %    MCV 93.1 81.4 - 97.8 fL    MCH 29.4 27.0 - 33.0 pg    MCHC 31.6 (L) 33.6 - 35.0 g/dL    RDW 43.7 35.9 - 50.0 fL    Platelet Count 213 164 - 446 K/uL    MPV 11.6 9.0 - 12.9 fL   ESTIMATED GFR    Collection Time: 02/13/20  9:07 AM   Result Value Ref Range    GFR If African American >60 >60 mL/min/1.73 m 2    GFR If Non African American >60 >60 mL/min/1.73 m 2   Lipid Profile    Collection Time: 02/13/20  9:07 AM   Result Value Ref Range    Cholesterol,Tot 278 (H) 100 - 199 mg/dL    Triglycerides 135 0 - 149 mg/dL    HDL 68 >=40 mg/dL     (H) <100 mg/dL   Comp " Metabolic Panel    Collection Time: 02/13/20  9:07 AM   Result Value Ref Range    Sodium 141 135 - 145 mmol/L    Potassium 4.9 3.6 - 5.5 mmol/L    Chloride 105 96 - 112 mmol/L    Co2 28 20 - 33 mmol/L    Anion Gap 8.0 0.0 - 11.9    Glucose 95 65 - 99 mg/dL    Bun 14 8 - 22 mg/dL    Creatinine 0.88 0.50 - 1.40 mg/dL    Calcium 10.0 8.5 - 10.5 mg/dL    AST(SGOT) 16 12 - 45 U/L    ALT(SGPT) 11 2 - 50 U/L    Alkaline Phosphatase 88 30 - 99 U/L    Total Bilirubin 0.8 0.1 - 1.5 mg/dL    Albumin 4.7 3.2 - 4.9 g/dL    Total Protein 7.9 6.0 - 8.2 g/dL    Globulin 3.2 1.9 - 3.5 g/dL    A-G Ratio 1.5 g/dL   FASTING STATUS    Collection Time: 02/13/20  9:07 AM   Result Value Ref Range    Fasting Status Fasting      Echocardiogram report reviewed from 2014 EF was 40      Assessment:     1. LBBB (left bundle branch block)     2. S/P AVR (aortic valve replacement) and aortoplasty     3. Dyslipidemia         Medical Decision Making:  Today's Assessment / Status / Plan:     It was my pleasure to meet with Ms. Doss.    Blood pressure is well controlled.      Her coronaries were clear her LDL is above goal she will work on diet    We discussed that we do follow-up echocardiogram every 5 years sooner for any symptoms of heart failure with a chronic left bundle branch block and mildly reduced EF    I will see Ms. Doss back in 1 year time and encouraged her to follow up with us over the phone or electronically using my MyChart as issues arise.    It is my pleasure to participate in the care of Ms. Doss.  Please do not hesitate to contact me with questions or concerns.    Woody Lang MD PhD [facility]  Cardiologist [facility]    Please note that this dictation was created using voice recognition software. I have worked with consultants from the vendor as well as technical experts from [facility] to optimize the interface. I have made every reasonable attempt to correct obvious errors, but I  expect that there are errors of grammar and possibly content I did not discover before finalizing the note.

## 2021-01-26 ENCOUNTER — OFFICE VISIT (OUTPATIENT)
Dept: MEDICAL GROUP | Facility: PHYSICIAN GROUP | Age: 71
End: 2021-01-26
Payer: MEDICARE

## 2021-01-26 VITALS
OXYGEN SATURATION: 96 % | TEMPERATURE: 98.6 F | WEIGHT: 217 LBS | BODY MASS INDEX: 34.87 KG/M2 | HEIGHT: 66 IN | RESPIRATION RATE: 14 BRPM | DIASTOLIC BLOOD PRESSURE: 74 MMHG | HEART RATE: 88 BPM | SYSTOLIC BLOOD PRESSURE: 128 MMHG

## 2021-01-26 DIAGNOSIS — E55.9 VITAMIN D DEFICIENCY: ICD-10-CM

## 2021-01-26 DIAGNOSIS — E78.5 DYSLIPIDEMIA: Chronic | ICD-10-CM

## 2021-01-26 DIAGNOSIS — Z13.6 SCREENING FOR CARDIOVASCULAR CONDITION: ICD-10-CM

## 2021-01-26 DIAGNOSIS — Z95.2 S/P AVR (AORTIC VALVE REPLACEMENT) AND AORTOPLASTY: ICD-10-CM

## 2021-01-26 PROCEDURE — 99213 OFFICE O/P EST LOW 20 MIN: CPT | Performed by: NURSE PRACTITIONER

## 2021-01-26 ASSESSMENT — FIBROSIS 4 INDEX: FIB4 SCORE: 1.59

## 2021-01-26 ASSESSMENT — PATIENT HEALTH QUESTIONNAIRE - PHQ9: CLINICAL INTERPRETATION OF PHQ2 SCORE: 0

## 2021-01-26 NOTE — ASSESSMENT & PLAN NOTE
Chronic health problem, uncertain control.  Patient taking calcium with vitamin D.  When she took additional supplemental vitamin D, she had worsening joint pain and myalgias.  She is not wanting to get updated vitamin D level.

## 2021-01-26 NOTE — PROGRESS NOTES
CC: Follow-up on chronic health problems, lab orders    HISTORY OF THE PRESENT ILLNESS: Patient is a 70 y.o. female. This pleasant patient is here today for evaluation and management of the following health problems.    Patient has not been seen in clinic in a year.  Reports she has been healthy, no significant changes.  She is scheduled to have Covid vaccine this week.    S/P AVR (aortic valve replacement) and aortoplasty  Chronic health problem.  Aortic valve replacement in 2014.  Continues with daily baby aspirin.  Followed by cardiology annually.  Upcoming appointment in early March.  Has been remaining active by walking 20 minutes twice a day, baling hay.  The patient denies chest pain, shortness of breath, headache, vision changes, epistaxis, or dyspnea on exertion.      Vitamin D deficiency  Chronic health problem, uncertain control.  Patient taking calcium with vitamin D.  When she took additional supplemental vitamin D, she had worsening joint pain and myalgias.  She is not wanting to get updated vitamin D level.    Dyslipidemia  Chronic health problem.  Elevated lipid levels. The 10-year ASCVD risk score ([place] PAUL Jr., et al., 2013) is: 10%  Patient certain she does not want to start on a statin.  Will discuss further with cardiology at upcoming appointment.    Component      Latest Ref Rng & Units 2/13/2020           9:07 AM   Cholesterol,Tot      100 - 199 mg/dL 278 (H)   Triglycerides      0 - 149 mg/dL 135   HDL      >=40 mg/dL 68   LDL      <100 mg/dL 183 (H)       Allergies: Cipro xr    Current Outpatient Medications Ordered in Epic   Medication Sig Dispense Refill   • aspirin EC (ECOTRIN) 81 MG TBEC Take 81 mg by mouth every day.       No current [facility]-ordered facility-administered medications on file.        Past Medical History:   Diagnosis Date   • Aortic stenosis 4/22/2014   • Atrial fibrillation (HCC) 4/24/2014    Transient, perioperative and not recurrent   • Elevated BP    • Hypertension    •  "Mixed hyperlipidemia    • S/P AVR (aortic valve replacement) and aortoplasty 2014    Dr. Rutledge,  23 mm Hurst Perimount Magna pericardial valve, aortic root enlargement (1.5 cm CorMatrix patch),          Past Surgical History:   Procedure Laterality Date   • AORTIC VALVE REPLACEMENT  2014    Performed by Lilly Rutledge M.D. at SURGERY [facility]   • WRIST ORIF  2010    Performed by OSGOOD, PATRICK J at SURGERY [facility]   • TENDON REPAIR  2010    Performed by OSGOOD, PATRICK J at SURGERY [facility]   • CYSTECTOMY  2007    Bracial Cleft cyst removed at [facility]       Social History     Tobacco Use   • Smoking status: Former Smoker     Packs/day: 2.00     Years: 30.00     Pack years: 60.00     Types: Cigarettes     Quit date: 1996     Years since quittin.7   • Smokeless tobacco: Never Used   • Tobacco comment: 2 packs a day for at least 30 years    Substance Use Topics   • Alcohol use: Yes     Alcohol/week: 0.0 oz     Comment: occ   • Drug use: No       Family History   Problem Relation Age of Onset   • Non-contributory Mother         basically healthy   • Heart Attack Father 76        MI   • Hypertension Father    • Hyperlipidemia Father    • Diabetes Paternal Grandfather 80        Type II Diabetes       ROS:   As in HPI, otherwise negative for chest pain, dyspnea, abdominal pain, dysuria, blood in stool, fever          Exam: /74 (BP Location: Left arm, Patient Position: Sitting, BP Cuff Size: Adult)   Pulse 88   Temp 37 °C (98.6 °F) (Temporal)   Resp 14   Ht 1.676 m (5' 6\")   Wt 98.4 kg (217 lb)   SpO2 96%  Body mass index is 35.02 kg/m².    General: Alert, pleasant, well nourished, well developed female in NAD  HEENT: Normocephalic. Eyes conjunctiva clear lids without ptosis, pupils equal and reactive to light, ears normal shape and contour, canals are clear bilaterally, tympanic membranes are pearly gray with good light reflex, nasal mucosa without erythema " and drainage.   Neck: Supple without bruit. Thyroid is not enlarged.  Pulmonary: Clear to ausculation.  Normal effort. No rales, ronchi, or wheezing.  Cardiovascular: Normal rate and rhythm with murmur. Carotid and radial pulses are intact and equal bilaterally.  No lower extremity edema.  Abdomen: Soft, nontender, nondistended. Normal bowel sounds. Liver and spleen are not palpable  Neurologic: Grossly nonfocal  Lymph: No cervical or supraclavicular lymph nodes are palpable  Skin: Warm and dry.    Musculoskeletal: Normal gait.   Psych: Normal mood and affect. Alert and oriented. Judgment and insight is normal.    Please note that this dictation was created using voice recognition software. I have made every reasonable attempt to correct obvious errors, but I expect that there are errors of grammar and possibly content that I did not discover before finalizing the note.      Assessment/Plan  1. S/P AVR (aortic valve replacement) and aortoplasty  No new concerns.  Continue follow-up with cardiology.  - CBC WITHOUT DIFFERENTIAL; Future    2. Vitamin D deficiency  Continue with calcium with vitamin D supplementation.    3. Dyslipidemia  Reviewed ASCVD risk with patient.  She does not want to start statin.  Reviewed lifestyle measures including continuation of routine aerobic exercise as tolerated, weight loss, low-fat diet.  - Lipid Profile; Future    4. Screening for cardiovascular condition  Notify patient of results.  - Comp Metabolic Panel; Future  - ESTIMATED GFR; Future  - CBC WITHOUT DIFFERENTIAL; Future    Patient will return to clinic annually or sooner if needed and pending lab results.

## 2021-01-26 NOTE — ASSESSMENT & PLAN NOTE
Chronic health problem.  Elevated lipid levels. The 10-year ASCVD risk score (Idalmis MILLER Jr., et al., 2013) is: 10%  Patient certain she does not want to start on a statin.  Will discuss further with cardiology at upcoming appointment.    Component      Latest Ref Rng & Units 2/13/2020           9:07 AM   Cholesterol,Tot      100 - 199 mg/dL 278 (H)   Triglycerides      0 - 149 mg/dL 135   HDL      >=40 mg/dL 68   LDL      <100 mg/dL 183 (H)

## 2021-01-26 NOTE — ASSESSMENT & PLAN NOTE
Chronic health problem.  Aortic valve replacement in 2014.  Continues with daily baby aspirin.  Followed by cardiology annually.  Upcoming appointment in early March.  Has been remaining active by walking 20 minutes twice a day, baling hay.  The patient denies chest pain, shortness of breath, headache, vision changes, epistaxis, or dyspnea on exertion.

## 2021-02-22 ENCOUNTER — HOSPITAL ENCOUNTER (OUTPATIENT)
Dept: LAB | Facility: MEDICAL CENTER | Age: 71
End: 2021-02-22
Attending: NURSE PRACTITIONER
Payer: MEDICARE

## 2021-02-22 DIAGNOSIS — E78.5 DYSLIPIDEMIA: Chronic | ICD-10-CM

## 2021-02-22 DIAGNOSIS — Z95.2 S/P AVR (AORTIC VALVE REPLACEMENT) AND AORTOPLASTY: ICD-10-CM

## 2021-02-22 DIAGNOSIS — Z13.6 SCREENING FOR CARDIOVASCULAR CONDITION: ICD-10-CM

## 2021-02-22 LAB
ALBUMIN SERPL BCP-MCNC: 4.2 G/DL (ref 3.2–4.9)
ALBUMIN/GLOB SERPL: 1.3 G/DL
ALP SERPL-CCNC: 102 U/L (ref 30–99)
ALT SERPL-CCNC: 12 U/L (ref 2–50)
ANION GAP SERPL CALC-SCNC: 10 MMOL/L (ref 7–16)
AST SERPL-CCNC: 20 U/L (ref 12–45)
BILIRUB SERPL-MCNC: 0.6 MG/DL (ref 0.1–1.5)
BUN SERPL-MCNC: 12 MG/DL (ref 8–22)
CALCIUM SERPL-MCNC: 9.9 MG/DL (ref 8.5–10.5)
CHLORIDE SERPL-SCNC: 104 MMOL/L (ref 96–112)
CHOLEST SERPL-MCNC: 230 MG/DL (ref 100–199)
CO2 SERPL-SCNC: 28 MMOL/L (ref 20–33)
CREAT SERPL-MCNC: 0.77 MG/DL (ref 0.5–1.4)
ERYTHROCYTE [DISTWIDTH] IN BLOOD BY AUTOMATED COUNT: 43.8 FL (ref 35.9–50)
FASTING STATUS PATIENT QL REPORTED: NORMAL
GLOBULIN SER CALC-MCNC: 3.2 G/DL (ref 1.9–3.5)
GLUCOSE SERPL-MCNC: 90 MG/DL (ref 65–99)
HCT VFR BLD AUTO: 47.3 % (ref 37–47)
HDLC SERPL-MCNC: 66 MG/DL
HGB BLD-MCNC: 15 G/DL (ref 12–16)
LDLC SERPL CALC-MCNC: 142 MG/DL
MCH RBC QN AUTO: 29.9 PG (ref 27–33)
MCHC RBC AUTO-ENTMCNC: 31.7 G/DL (ref 33.6–35)
MCV RBC AUTO: 94.4 FL (ref 81.4–97.8)
PLATELET # BLD AUTO: 214 K/UL (ref 164–446)
PMV BLD AUTO: 11.7 FL (ref 9–12.9)
POTASSIUM SERPL-SCNC: 4.6 MMOL/L (ref 3.6–5.5)
PROT SERPL-MCNC: 7.4 G/DL (ref 6–8.2)
RBC # BLD AUTO: 5.01 M/UL (ref 4.2–5.4)
SODIUM SERPL-SCNC: 142 MMOL/L (ref 135–145)
TRIGL SERPL-MCNC: 110 MG/DL (ref 0–149)
WBC # BLD AUTO: 7.3 K/UL (ref 4.8–10.8)

## 2021-02-22 PROCEDURE — 80061 LIPID PANEL: CPT

## 2021-02-22 PROCEDURE — 36415 COLL VENOUS BLD VENIPUNCTURE: CPT

## 2021-02-22 PROCEDURE — 80053 COMPREHEN METABOLIC PANEL: CPT

## 2021-02-22 PROCEDURE — 85027 COMPLETE CBC AUTOMATED: CPT

## 2021-03-04 ENCOUNTER — OFFICE VISIT (OUTPATIENT)
Dept: CARDIOLOGY | Facility: PHYSICIAN GROUP | Age: 71
End: 2021-03-04
Payer: MEDICARE

## 2021-03-04 VITALS
HEIGHT: 66 IN | OXYGEN SATURATION: 94 % | HEART RATE: 77 BPM | SYSTOLIC BLOOD PRESSURE: 132 MMHG | BODY MASS INDEX: 34.39 KG/M2 | DIASTOLIC BLOOD PRESSURE: 84 MMHG | WEIGHT: 214 LBS

## 2021-03-04 DIAGNOSIS — Z95.2 S/P AVR (AORTIC VALVE REPLACEMENT) AND AORTOPLASTY: ICD-10-CM

## 2021-03-04 DIAGNOSIS — I50.22 CHF (CONGESTIVE HEART FAILURE), NYHA CLASS II, CHRONIC, SYSTOLIC (HCC): Chronic | ICD-10-CM

## 2021-03-04 DIAGNOSIS — E78.5 DYSLIPIDEMIA: Chronic | ICD-10-CM

## 2021-03-04 DIAGNOSIS — I44.7 LBBB (LEFT BUNDLE BRANCH BLOCK): ICD-10-CM

## 2021-03-04 PROCEDURE — 99214 OFFICE O/P EST MOD 30 MIN: CPT | Performed by: INTERNAL MEDICINE

## 2021-03-04 ASSESSMENT — ENCOUNTER SYMPTOMS
PND: 0
DIZZINESS: 0
FEVER: 0
PALPITATIONS: 0
SHORTNESS OF BREATH: 0
FOCAL WEAKNESS: 0
WEAKNESS: 0
FALLS: 0
COUGH: 0
BLURRED VISION: 0
BRUISES/BLEEDS EASILY: 0
NAUSEA: 0
CLAUDICATION: 0
CHILLS: 0
ABDOMINAL PAIN: 0
SORE THROAT: 0

## 2021-03-04 ASSESSMENT — FIBROSIS 4 INDEX: FIB4 SCORE: 1.89

## 2021-03-04 NOTE — PROGRESS NOTES
Chief Complaint   Patient presents with   • Dyslipidemia     F/V DX:LBBB (left bundle branch block       Subjective:   Deana Doss is a 70 y.o. female who presents today for follow-up of her history of severe stenosis with mildly reduced ejection fraction    She is been doing well over the past year no major health concerns tolerating medications well    Past Medical History:   Diagnosis Date   • Aortic stenosis 2014   • Atrial fibrillation (HCC) 2014    Transient, perioperative and not recurrent   • Elevated BP    • Hypertension    • Mixed hyperlipidemia    • S/P AVR (aortic valve replacement) and aortoplasty 2014    Dr. Rutledge,  23 mm Hurst Perimount Magna pericardial valve, aortic root enlargement (1.5 cm CorMatrix patch),        Past Surgical History:   Procedure Laterality Date   • AORTIC VALVE REPLACEMENT  2014    Performed by Lilly Rutledge M.D. at SURGERY [facility]   • WRIST ORIF  2010    Performed by OSGOOD, PATRICK J at SURGERY [facility]   • TENDON REPAIR  2010    Performed by OSGOOD, PATRICK J at SURGERY [facility]   • CYSTECTOMY      Bracial Cleft cyst removed at [facility]     Family History   Problem Relation Age of Onset   • Non-contributory Mother         basically healthy   • Heart Attack Father 76        MI   • Hypertension Father    • Hyperlipidemia Father    • Diabetes Paternal Grandfather 80        Type II Diabetes     Social History     Socioeconomic History   • Marital status:      Spouse name: Not on file   • Number of children: Not on file   • Years of education: Not on file   • Highest education level: Not on file   Occupational History   • Not on file   Tobacco Use   • Smoking status: Former Smoker     Packs/day: 2.00     Years: 30.00     Pack years: 60.00     Types: Cigarettes     Quit date: 1996     Years since quittin.8   • Smokeless tobacco: Never Used   • Tobacco comment: 2 packs a day for at least 30 years     Substance and Sexual Activity   • Alcohol use: Yes     Alcohol/week: 0.0 oz     Comment: occ   • Drug use: No   • Sexual activity: Not Currently     Partners: Male     Birth control/protection: Post-Menopausal     Comment: , retired   Other Topics Concern   • Not on file   Social History Narrative   • Not on file     Social Determinants of Health     Financial Resource Strain:    • Difficulty of Paying Living Expenses:    Food Insecurity:    • Worried About Running Out of Food in the Last Year:    • Ran Out of Food in the Last Year:    Transportation Needs:    • Lack of Transportation (Medical):    • Lack of Transportation (Non-Medical):    Physical Activity:    • Days of Exercise per Week:    • Minutes of Exercise per Session:    Stress:    • Feeling of Stress :    Social Connections:    • Frequency of Communication with Friends and Family:    • Frequency of Social Gatherings with Friends and Family:    • Attends Muslim Services:    • Active Member of Clubs or Organizations:    • Attends Club or Organization Meetings:    • Marital Status:    Intimate Partner Violence:    • Fear of Current or Ex-Partner:    • Emotionally Abused:    • Physically Abused:    • Sexually Abused:      Allergies   Allergen Reactions   • Cipro Xr Hives     Outpatient Encounter Medications as of 3/4/2021   Medication Sig Dispense Refill   • aspirin EC (ECOTRIN) 81 MG TBEC Take 81 mg by mouth every day.       No facility-administered encounter medications on file as of 3/4/2021.     Review of Systems   Constitutional: Negative for chills and fever.   HENT: Negative for sore throat.    Eyes: Negative for blurred vision.   Respiratory: Negative for cough and shortness of breath.    Cardiovascular: Negative for chest pain, palpitations, claudication, leg swelling and PND.   Gastrointestinal: Negative for abdominal pain and nausea.   Musculoskeletal: Negative for falls and joint pain.   Skin: Negative for rash.   Neurological:  "Negative for dizziness, focal weakness and weakness.   Endo/Heme/Allergies: Does not bruise/bleed easily.        Objective:   /84 (BP Location: Left arm, Patient Position: Sitting, BP Cuff Size: Adult)   Pulse 77   Ht 1.676 m (5' 6\")   Wt 97.1 kg (214 lb)   SpO2 94%   BMI 34.54 kg/m²     Physical Exam   Constitutional: No distress.   HENT:   Patient wearing a mask due to COVID precautions   Eyes: No scleral icterus.   Neck: No JVD present.   Cardiovascular: Normal rate. Exam reveals no gallop and no friction rub.   Murmur heard.  Pulmonary/Chest: No respiratory distress. She has no wheezes. She has no rales.   Abdominal: Soft. Bowel sounds are normal.   Musculoskeletal:         General: No edema.   Neurological: She is alert.   Skin: No rash noted. She is not diaphoretic.   Psychiatric: She has a normal mood and affect.     We reviewed in person the most recent labs  Recent Results (from the past 4032 hour(s))   FASTING STATUS    Collection Time: 02/22/21  7:33 AM   Result Value Ref Range    Fasting Status Fasting    CBC WITHOUT DIFFERENTIAL    Collection Time: 02/22/21  7:34 AM   Result Value Ref Range    WBC 7.3 4.8 - 10.8 K/uL    RBC 5.01 4.20 - 5.40 M/uL    Hemoglobin 15.0 12.0 - 16.0 g/dL    Hematocrit 47.3 (H) 37.0 - 47.0 %    MCV 94.4 81.4 - 97.8 fL    MCH 29.9 27.0 - 33.0 pg    MCHC 31.7 (L) 33.6 - 35.0 g/dL    RDW 43.8 35.9 - 50.0 fL    Platelet Count 214 164 - 446 K/uL    MPV 11.7 9.0 - 12.9 fL   Lipid Profile    Collection Time: 02/22/21  7:34 AM   Result Value Ref Range    Cholesterol,Tot 230 (H) 100 - 199 mg/dL    Triglycerides 110 0 - 149 mg/dL    HDL 66 >=40 mg/dL     (H) <100 mg/dL   ESTIMATED GFR    Collection Time: 02/22/21  7:34 AM   Result Value Ref Range    GFR If African American >60 >60 mL/min/1.73 m 2    GFR If Non African American >60 >60 mL/min/1.73 m 2   Comp Metabolic Panel    Collection Time: 02/22/21  7:34 AM   Result Value Ref Range    Sodium 142 135 - 145 mmol/L    " Potassium 4.6 3.6 - 5.5 mmol/L    Chloride 104 96 - 112 mmol/L    Co2 28 20 - 33 mmol/L    Anion Gap 10.0 7.0 - 16.0    Glucose 90 65 - 99 mg/dL    Bun 12 8 - 22 mg/dL    Creatinine 0.77 0.50 - 1.40 mg/dL    Calcium 9.9 8.5 - 10.5 mg/dL    AST(SGOT) 20 12 - 45 U/L    ALT(SGPT) 12 2 - 50 U/L    Alkaline Phosphatase 102 (H) 30 - 99 U/L    Total Bilirubin 0.6 0.1 - 1.5 mg/dL    Albumin 4.2 3.2 - 4.9 g/dL    Total Protein 7.4 6.0 - 8.2 g/dL    Globulin 3.2 1.9 - 3.5 g/dL    A-G Ratio 1.3 g/dL       Assessment:     1. LBBB (left bundle branch block)     2. S/P AVR (aortic valve replacement) and aortoplasty     3. Dyslipidemia     4. CHF (congestive heart failure), NYHA class II, chronic, systolic (HCC) EF 40%         Medical Decision Making:  Today's Assessment / Status / Plan:     It was my pleasure to meet with Ms. Doss.    Blood pressure is well controlled.  We specifically assessed the labs on hypertension treatment    The 10-year ASCVD risk score (Idalmis DC Jr., et al., 2013) is: 10.1%    She understands the importance of antiplatelet therapy as well as dental prophylaxis for the health of their aortic valve replacement.  We have also ensured that she has appropriate echocardiogram follow-up.    I will see Ms. Doss back in 1 year time and encouraged her to follow up with us over the phone or electronically using my MyChart as issues arise.    It is my pleasure to participate in the care of Ms. Doss.  Please do not hesitate to contact me with questions or concerns.    Woody Lang MD PhD [facility]  Cardiologist [facility]    Please note that this dictation was created using voice recognition software. There may be errors I did not discover before finalizing the note.

## 2021-03-04 NOTE — PATIENT INSTRUCTIONS
Work on at least 1.5 - 5 hours a week of moderate exercise (typical brisk walking or similar activity)    If you have had a heart attack, stent, bypass or reduced heart strength (EF <35%): cardiac rehab may reduce your risk of dying by 13-24% and need to go to the hospital by 30% within the first year (1)    Please look into the following diets and incorporate them into your diet    LOW SALT DIET   KEEP YOUR SODIUM EQUAL TO CALORIES AND NO MORE THAN DOUBLE THE CALORIES FOR A LOW SALT DIET    Cardiosmart.org - great resource for American College of Cardiology on heart disease prevention and treatment    FOR TREATMENT OR PREVENTION OF CORONARY ARTERY DISEASE  These three programs are approved by Medicare/Insurers for those with heart disease  [facility] - [facility]  Dr. De La Cruz's Program for Reversing Heart Disease - Jose Alejandro Rao's Cardiologist vegetarian-based  [facility] - [place]-based mind-body Program    This is a commonly referenced Program  Dr Zavala - Marj over [place] (book and documentary) - vegetarian-based    FOR TREATMENT OF BLOOD PRESSURE  DASH DIET - American Heart Association for treatment of HYPERTENSION    FOR TREATMENT OF BAD CHOLESTEROL/FATS  REDUCE PROCESSED SUGAR AS MUCH AS POSSIBLE  INCREASE WHOLE GRAINS/VEGETABLES  INCREASE FIBER    Lowering total cholesterol and LDL (bad) cholesterol:  - Eat leaner cuts of meat, or eliminate altogether if possible red meat, and frequently substitute fish or chicken.  - Limit saturated fat to no more than 7-10% of total calories no more than 10 g per day is recommended. Some sources of saturated fat include butter, animal fats, hydrogenated vegetable fats and oils, many desserts, whole milk dairy products.  - Replaced saturated fats with polyunsaturated fats and monounsaturated fats. Foods high in monounsaturated fat include nuts, canola oil, avocados, and olives.  - Limit trans fat (processed foods)  and replaced with fresh fruits and vegetables  - Recommend nonfat dairy products  - Increase substantially the amount of soluble fiber intake (legumes such as beans, fruit, whole grains).  - Consider nutritional supplements: plant sterile spreads such as Benecol, fish oil,  flaxseed oil, omega-3 acids capsules 1000 mg twice a day, or viscous fiber such as Metamucil  - Attain ideal weight and regular exercise (at least 30 minutes per day of moderate exercise)  ASK ABOUT STATIN OR NON STATIN MEDICATION TO REDUCE YOUR LDL AND HEART RISK    Lowering triglycerides:  - Reduce intake of simple sugar: Desserts, candy, pastries, honey, sodas, sugared cereals, yogurt, Gatorade, sports bars, canned fruit, smoothies, fruit juice, coffee drinks  - Reduced intake of refined starches: Refined Pasta, most bread  - Reduce or abstain from alcohol  - Increase omega-3 fatty acids: [place], Trout, Mackerel, [place], [place] tuna and supplements  - Attain ideal weight and regular exercise (at least 30 minutes per day of moderate exercise)  ASK ABOUT PURIFIED OMEGA 3 EPA or FISH OIL TO REDUCE YOUR TG AND HEART RISK    Elevating HDL (good) cholesterol:  - Increase physical activity  - Increase omega-3 fatty acids and supplements as listed above  - Incorporating appropriate amounts of monounsaturated fats such as nuts, olive oil, canola oil, avocados, olives  - Stop smoking  - Attain ideal weight and regular exercise (at least 30 minutes per day of moderate exercise)

## 2021-03-15 DIAGNOSIS — Z23 NEED FOR VACCINATION: ICD-10-CM

## 2022-01-28 ENCOUNTER — OFFICE VISIT (OUTPATIENT)
Dept: MEDICAL GROUP | Facility: PHYSICIAN GROUP | Age: 72
End: 2022-01-28
Payer: MEDICARE

## 2022-01-28 VITALS
HEART RATE: 77 BPM | BODY MASS INDEX: 34.44 KG/M2 | TEMPERATURE: 97.8 F | SYSTOLIC BLOOD PRESSURE: 132 MMHG | WEIGHT: 214.3 LBS | OXYGEN SATURATION: 94 % | HEIGHT: 66 IN | RESPIRATION RATE: 16 BRPM | DIASTOLIC BLOOD PRESSURE: 80 MMHG

## 2022-01-28 DIAGNOSIS — E55.9 VITAMIN D DEFICIENCY: ICD-10-CM

## 2022-01-28 DIAGNOSIS — M85.80 OSTEOPENIA DETERMINED BY X-RAY: ICD-10-CM

## 2022-01-28 DIAGNOSIS — E78.5 DYSLIPIDEMIA: ICD-10-CM

## 2022-01-28 DIAGNOSIS — I50.22 CHF (CONGESTIVE HEART FAILURE), NYHA CLASS II, CHRONIC, SYSTOLIC (HCC): ICD-10-CM

## 2022-01-28 DIAGNOSIS — Z95.2 S/P AVR (AORTIC VALVE REPLACEMENT) AND AORTOPLASTY: ICD-10-CM

## 2022-01-28 DIAGNOSIS — Z00.00 HEALTHCARE MAINTENANCE: ICD-10-CM

## 2022-01-28 PROCEDURE — 99213 OFFICE O/P EST LOW 20 MIN: CPT | Performed by: NURSE PRACTITIONER

## 2022-01-28 ASSESSMENT — PATIENT HEALTH QUESTIONNAIRE - PHQ9: CLINICAL INTERPRETATION OF PHQ2 SCORE: 0

## 2022-01-28 ASSESSMENT — FIBROSIS 4 INDEX: FIB4 SCORE: 1.92

## 2022-01-28 NOTE — ASSESSMENT & PLAN NOTE
Chronic in nature.  Aortic valve replacement in 2014.  Managed by cardiology.  Sees cards annually.  Continues with daily baby aspirin.  Walks for exercise twice a day.  The patient denies chest pain, shortness of breath, headache, vision changes, epistaxis, or dyspnea on exertion.

## 2022-01-28 NOTE — ASSESSMENT & PLAN NOTE
Reviewed again recommendations for screening mammogram, colon cancer screening, DEXA scan.  Patient declines all at this time.

## 2022-01-28 NOTE — ASSESSMENT & PLAN NOTE
Chronic health problem.  Due for updated lipid levels.  Patient has been counseled about statin therapy by both cardiology and myself.  She strongly declines. The 10-year ASCVD risk score (Idalmis PAUL Jr., et al., 2013) is: 11.3%

## 2022-01-28 NOTE — PROGRESS NOTES
CC: Annual follow-up    HISTORY OF THE PRESENT ILLNESS: Patient is a 71 y.o. female. This pleasant patient is here today for evaluation and management of the following health problems.    Since last appointment, patient has had cataract surgery.  Reports right torn macula and is following up with ophthalmology.  Will likely have to have surgery.      Osteopenia determined by x-ray  Last DEXA scan in 2017, scanned into media tab.  Does show osteopenia.  Patient has been taking calcium supplement with vitamin D, doing weightbearing exercises.  Due for updated DEXA scan.  Patient declines.    Vitamin D deficiency  Taking calcium with vitamin D.    S/P AVR (aortic valve replacement) and aortoplasty  Chronic in nature.  Aortic valve replacement in 2014.  Managed by cardiology.  Sees cards annually.  Continues with daily baby aspirin.  Walks for exercise twice a day.  The patient denies chest pain, shortness of breath, headache, vision changes, epistaxis, or dyspnea on exertion.      Dyslipidemia  Chronic health problem.  Due for updated lipid levels.  Patient has been counseled about statin therapy by both cardiology and myself.  She strongly declines. The 10-year ASCVD risk score (Idalmis DC Jr., et al., 2013) is: 11.3%      CHF (congestive heart failure), NYHA class II, chronic, systolic (HCC) EF 40%  Chronic in nature.  Managed by cardiology, whom she sees annually.  Next appointment is in April.  Denies shortness of breath, edema, chest pain.    Healthcare maintenance  Reviewed again recommendations for screening mammogram, colon cancer screening, DEXA scan.  Patient declines all at this time.      Allergies: Cipro xr    Current Outpatient Medications Ordered in Epic   Medication Sig Dispense Refill   • aspirin EC (ECOTRIN) 81 MG TBEC Take 81 mg by mouth every day.       No current [facility]-ordered facility-administered medications on file.       Past Medical History:   Diagnosis Date   • Aortic stenosis 4/22/2014   •  "Atrial fibrillation (HCC) 2014    Transient, perioperative and not recurrent   • CHF (congestive heart failure), NYHA class II, chronic, systolic (HCC) EF 40%    • Elevated BP    • Hypertension    • Mixed hyperlipidemia    • S/P AVR (aortic valve replacement) and aortoplasty 2014    Dr. Rutledge,  23 mm Hurst Perimount Magna pericardial valve, aortic root enlargement (1.5 cm CorMatrix patch),          Past Surgical History:   Procedure Laterality Date   • CATARACT EXTRACTION WITH IOL Bilateral 2021   • AORTIC VALVE REPLACEMENT  2014    Performed by Lilly Rutledge M.D. at SURGERY [facility]   • ORIF, WRIST  2010    Performed by OSGOOD, PATRICK J at SURGERY [facility]   • TENDON REPAIR  2010    Performed by OSGOOD, PATRICK J at SURGERY [facility]   • CYSTECTOMY      Bracial Cleft cyst removed at [facility]       Social History     Tobacco Use   • Smoking status: Former Smoker     Packs/day: 2.00     Years: 30.00     Pack years: 60.00     Types: Cigarettes     Quit date: 1996     Years since quittin.7   • Smokeless tobacco: Never Used   • Tobacco comment: 2 packs a day for at least 30 years    Vaping Use   • Vaping Use: Never used   Substance Use Topics   • Alcohol use: Yes     Alcohol/week: 0.0 oz     Comment: occ   • Drug use: No       Family History   Problem Relation Age of Onset   • Non-contributory Mother         basically healthy   • Heart Attack Father 76        MI   • Hypertension Father    • Hyperlipidemia Father    • Diabetes Paternal Grandfather 80        Type II Diabetes       ROS:  As in HPI, otherwise negative for chest pain, dyspnea, abdominal pain, dysuria, blood in stool, fever          Exam: /80 (BP Location: Left arm, Patient Position: Sitting, BP Cuff Size: Adult)   Pulse 77   Temp 36.6 °C (97.8 °F) (Temporal)   Resp 16   Ht 1.676 m (5' 6\")   Wt 97.2 kg (214 lb 4.8 oz)   SpO2 94%  Body mass index is 34.59 kg/m².    General: Alert, " pleasant, well nourished, well developed female in NAD  HEENT: Normocephalic. Eyes conjunctiva clear lids without ptosis, pupils equal and reactive to light, ears normal shape and contour, canals are clear bilaterally, tympanic membranes are pearly gray with good light reflex, nasal mucosa without erythema and drainage, oropharynx is without erythema, edema or exudates.   Neck: Supple without bruit. Thyroid is not enlarged.  Pulmonary: Clear to ausculation.  Normal effort. No rales, ronchi, or wheezing.  Cardiovascular: Normal rate and rhythm without murmur. Carotid and radial pulses are intact and equal bilaterally.  No lower extremity edema.  Abdomen: Soft, nontender, nondistended. Normal bowel sounds. Liver and spleen are not palpable  Neurologic: Grossly nonfocal  Lymph: No cervical or supraclavicular lymph nodes are palpable  Skin: Warm and dry.   Musculoskeletal: Normal gait.   Psych: Normal mood and affect. Alert and oriented. Judgment and insight is normal.    Please note that this dictation was created using voice recognition software. I have made every reasonable attempt to correct obvious errors, but I expect that there are errors of grammar and possibly content that I did not discover before finalizing the note.      Assessment/Plan  1. CHF (congestive heart failure), NYHA class II, chronic, systolic (HCC) EF 40%  Well-controlled.  Continue follow-up with cardiology.  - Comp Metabolic Panel; Future  - ESTIMATED GFR; Future  - CBC WITH DIFFERENTIAL; Future    2. Dyslipidemia  Reviewed ASCVD risk.  Patient declines statin.  - Lipid Profile; Future    3. Osteopenia determined by x-ray  Continue with calcium and vitamin d supplementation.  Declines updated bone density scan.    4. Vitamin D deficiency  Continue with vitamin D supplementation    5. S/P AVR (aortic valve replacement) and aortoplasty  Continue follow-up with cardiology    6. Healthcare maintenance  Advised patient on all healthcare maintenance.   She declines all screenings.    Patient will return to clinic annually or sooner if needed.

## 2022-01-28 NOTE — ASSESSMENT & PLAN NOTE
Chronic in nature.  Managed by cardiology, whom she sees annually.  Next appointment is in April.  Denies shortness of breath, edema, chest pain.

## 2022-01-28 NOTE — ASSESSMENT & PLAN NOTE
Last DEXA scan in 2017, scanned into media tab.  Does show osteopenia.  Patient has been taking calcium supplement with vitamin D, doing weightbearing exercises.  Due for updated DEXA scan.  Patient declines.

## 2022-02-15 ENCOUNTER — HOSPITAL ENCOUNTER (OUTPATIENT)
Dept: LAB | Facility: MEDICAL CENTER | Age: 72
End: 2022-02-15
Attending: NURSE PRACTITIONER
Payer: MEDICARE

## 2022-02-15 DIAGNOSIS — I50.22 CHF (CONGESTIVE HEART FAILURE), NYHA CLASS II, CHRONIC, SYSTOLIC (HCC): ICD-10-CM

## 2022-02-15 DIAGNOSIS — E78.5 DYSLIPIDEMIA: ICD-10-CM

## 2022-02-15 LAB
ALBUMIN SERPL BCP-MCNC: 4.3 G/DL (ref 3.2–4.9)
ALBUMIN/GLOB SERPL: 1.5 G/DL
ALP SERPL-CCNC: 109 U/L (ref 30–99)
ALT SERPL-CCNC: 11 U/L (ref 2–50)
ANION GAP SERPL CALC-SCNC: 9 MMOL/L (ref 7–16)
AST SERPL-CCNC: 19 U/L (ref 12–45)
BILIRUB SERPL-MCNC: 0.6 MG/DL (ref 0.1–1.5)
BUN SERPL-MCNC: 15 MG/DL (ref 8–22)
CALCIUM SERPL-MCNC: 9.4 MG/DL (ref 8.5–10.5)
CHLORIDE SERPL-SCNC: 103 MMOL/L (ref 96–112)
CHOLEST SERPL-MCNC: 236 MG/DL (ref 100–199)
CO2 SERPL-SCNC: 28 MMOL/L (ref 20–33)
CREAT SERPL-MCNC: 0.81 MG/DL (ref 0.5–1.4)
FASTING STATUS PATIENT QL REPORTED: NORMAL
GLOBULIN SER CALC-MCNC: 2.9 G/DL (ref 1.9–3.5)
GLUCOSE SERPL-MCNC: 94 MG/DL (ref 65–99)
HDLC SERPL-MCNC: 81 MG/DL
LDLC SERPL CALC-MCNC: 135 MG/DL
POTASSIUM SERPL-SCNC: 4.4 MMOL/L (ref 3.6–5.5)
PROT SERPL-MCNC: 7.2 G/DL (ref 6–8.2)
SODIUM SERPL-SCNC: 140 MMOL/L (ref 135–145)
TRIGL SERPL-MCNC: 102 MG/DL (ref 0–149)

## 2022-02-15 PROCEDURE — 85025 COMPLETE CBC W/AUTO DIFF WBC: CPT

## 2022-02-15 PROCEDURE — 36415 COLL VENOUS BLD VENIPUNCTURE: CPT

## 2022-02-15 PROCEDURE — 80061 LIPID PANEL: CPT

## 2022-02-15 PROCEDURE — 80053 COMPREHEN METABOLIC PANEL: CPT

## 2022-02-16 LAB
BASOPHILS # BLD AUTO: 0.3 % (ref 0–1.8)
BASOPHILS # BLD: 0.03 K/UL (ref 0–0.12)
EOSINOPHIL # BLD AUTO: 0.04 K/UL (ref 0–0.51)
EOSINOPHIL NFR BLD: 0.5 % (ref 0–6.9)
ERYTHROCYTE [DISTWIDTH] IN BLOOD BY AUTOMATED COUNT: 43.5 FL (ref 35.9–50)
HCT VFR BLD AUTO: 46.9 % (ref 37–47)
HGB BLD-MCNC: 15.1 G/DL (ref 12–16)
IMM GRANULOCYTES # BLD AUTO: 0.04 K/UL (ref 0–0.11)
IMM GRANULOCYTES NFR BLD AUTO: 0.5 % (ref 0–0.9)
LYMPHOCYTES # BLD AUTO: 1.99 K/UL (ref 1–4.8)
LYMPHOCYTES NFR BLD: 22.7 % (ref 22–41)
MCH RBC QN AUTO: 29.7 PG (ref 27–33)
MCHC RBC AUTO-ENTMCNC: 32.2 G/DL (ref 33.6–35)
MCV RBC AUTO: 92.3 FL (ref 81.4–97.8)
MONOCYTES # BLD AUTO: 0.4 K/UL (ref 0–0.85)
MONOCYTES NFR BLD AUTO: 4.6 % (ref 0–13.4)
NEUTROPHILS # BLD AUTO: 6.26 K/UL (ref 2–7.15)
NEUTROPHILS NFR BLD: 71.4 % (ref 44–72)
NRBC # BLD AUTO: 0 K/UL
NRBC BLD-RTO: 0 /100 WBC
PLATELET # BLD AUTO: 212 K/UL (ref 164–446)
PMV BLD AUTO: 12 FL (ref 9–12.9)
RBC # BLD AUTO: 5.08 M/UL (ref 4.2–5.4)
WBC # BLD AUTO: 8.8 K/UL (ref 4.8–10.8)

## 2022-02-25 ENCOUNTER — TELEPHONE (OUTPATIENT)
Dept: CARDIOLOGY | Facility: MEDICAL CENTER | Age: 72
End: 2022-02-25
Payer: MEDICARE

## 2022-02-26 NOTE — TELEPHONE ENCOUNTER
Received fax from [facility] Pre-Admit (P: [phone]  F: [phone]) requesting:    - cardiac clearance for upcoming eye surgery scheduled 3/2/2022.    Clearance request relayed to MD for advise.  Fax sent to scanning for reference via [employer], completed status received.

## 2022-03-01 NOTE — TELEPHONE ENCOUNTER
She can proceed with the proposed procedure or surgery, no modifiable cardiovascular risk, no further cardiac testing required, hold antiplatelet as necessary.    It is my pleasure to participate in the care of Ms. Doss.  Please do not hesitate to contact me with questions or concerns. [facility] is available 24/7 for consultative services at [phone] in the perioperative period.    Electronically Signed    Woody Lang MD PhD [facility]  Cardiologist [facility]    Please note that this dictation was created using voice recognition software. There may be errors I did not discover before finalizing the note.

## 2022-03-01 NOTE — TELEPHONE ENCOUNTER
Telephone note signed by MD printed and faxed to [phone], completed status.    Fax sent to scanning for reference.

## 2022-04-07 ENCOUNTER — OFFICE VISIT (OUTPATIENT)
Dept: CARDIOLOGY | Facility: PHYSICIAN GROUP | Age: 72
End: 2022-04-07
Payer: MEDICARE

## 2022-04-07 VITALS
SYSTOLIC BLOOD PRESSURE: 134 MMHG | HEIGHT: 66 IN | WEIGHT: 212.2 LBS | DIASTOLIC BLOOD PRESSURE: 70 MMHG | BODY MASS INDEX: 34.1 KG/M2 | OXYGEN SATURATION: 93 % | RESPIRATION RATE: 14 BRPM | HEART RATE: 77 BPM

## 2022-04-07 DIAGNOSIS — I44.7 LBBB (LEFT BUNDLE BRANCH BLOCK): ICD-10-CM

## 2022-04-07 DIAGNOSIS — E66.9 OBESITY (BMI 35.0-39.9 WITHOUT COMORBIDITY): ICD-10-CM

## 2022-04-07 DIAGNOSIS — I50.22 CHF (CONGESTIVE HEART FAILURE), NYHA CLASS II, CHRONIC, SYSTOLIC (HCC): ICD-10-CM

## 2022-04-07 DIAGNOSIS — Z95.2 S/P AVR (AORTIC VALVE REPLACEMENT) AND AORTOPLASTY: ICD-10-CM

## 2022-04-07 DIAGNOSIS — E78.5 DYSLIPIDEMIA: Chronic | ICD-10-CM

## 2022-04-07 PROCEDURE — 99214 OFFICE O/P EST MOD 30 MIN: CPT | Performed by: NURSE PRACTITIONER

## 2022-04-07 RX ORDER — PREDNISOLONE ACETATE 10 MG/ML
SUSPENSION/ DROPS OPHTHALMIC
COMMUNITY
Start: 2022-02-22 | End: 2023-01-28

## 2022-04-07 RX ORDER — TOBRAMYCIN 3 MG/ML
SOLUTION/ DROPS OPHTHALMIC
COMMUNITY
Start: 2022-02-22 | End: 2022-04-07

## 2022-04-07 ASSESSMENT — ENCOUNTER SYMPTOMS
ORTHOPNEA: 0
HEADACHES: 0
DIZZINESS: 0
NAUSEA: 0
INSOMNIA: 0
BRUISES/BLEEDS EASILY: 0
CHILLS: 0
SHORTNESS OF BREATH: 0
LOSS OF CONSCIOUSNESS: 0
PALPITATIONS: 0
PND: 0
MYALGIAS: 0
FEVER: 0
ABDOMINAL PAIN: 0

## 2022-04-07 ASSESSMENT — FIBROSIS 4 INDEX: FIB4 SCORE: 1.92

## 2022-04-07 NOTE — PROGRESS NOTES
Chief Complaint   Patient presents with   • Follow-Up   • Prosthetic Heart Valve/Issue   • Aortic Stenosis   • CHF (Compensated)   • Hyperlipidemia       Subjective     Deana Doss is a 71 y.o. female who presents today for annual follow-up of severe AS with AVR, post-operative AFib and hyperlipidemia.    Deana is a 71 year old female with history of severe AS, status post AVR in 2014 with some arash-operative atrial fibrillation, but none since; she also has hyperlipidemia, but declines statins (or other medical therapy). She was last seen by Dr. Lang in March 2021.     She is here today for annual follow-up. Since the last visit, her  had a stroke, and is recovering from this. She herself had a right retinal/macular tear, and has had several surgeries, with modest results. She otherwise feels well:   no chest pain, pressure, tightness or discomfort; no palpitations; no shortness of breath, orthopnea or PND; no dizziness or syncope; no edema. BP is good. She continues to work on diet and exercise for management of her lipids; she declines treatment.    Past Medical History:   Diagnosis Date   • Aortic stenosis 4/22/2014   • Atrial fibrillation (HCC) 4/24/2014    Transient, perioperative and not recurrent   • CHF (congestive heart failure), NYHA class II, chronic, systolic (HCC) EF 40%    • Elevated BP    • Hypertension    • Mixed hyperlipidemia    • S/P AVR (aortic valve replacement) and aortoplasty 4/24/2014    Dr. Rutledge,  23 mm Hurst Perimount Magna pericardial valve, aortic root enlargement (1.5 cm CorMatrix patch),        Past Surgical History:   Procedure Laterality Date   • CATARACT EXTRACTION WITH IOL Bilateral 12/2021   • AORTIC VALVE REPLACEMENT  4/25/2014    Performed by Lilly Rutledge M.D. at SURGERY [facility]   • ORIF, WRIST  9/22/2010    Performed by OSGOOD, PATRICK J at SURGERY [facility]   • TENDON REPAIR  9/22/2010    Performed by OSGOOD, PATRICK J at [facility]  [facility]   • CYSTECTOMY      Bracial Cleft cyst removed at [facility]     Family History   Problem Relation Age of Onset   • Non-contributory Mother         basically healthy   • Heart Attack Father 76        MI   • Hypertension Father    • Hyperlipidemia Father    • Diabetes Paternal Grandfather 80        Type II Diabetes     Social History     Socioeconomic History   • Marital status:      Spouse name: Not on file   • Number of children: Not on file   • Years of education: Not on file   • Highest education level: Not on file   Occupational History   • Not on file   Tobacco Use   • Smoking status: Former Smoker     Packs/day: 2.00     Years: 30.00     Pack years: 60.00     Types: Cigarettes     Quit date: 1996     Years since quittin.9   • Smokeless tobacco: Never Used   • Tobacco comment: 2 packs a day for at least 30 years    Vaping Use   • Vaping Use: Never used   Substance and Sexual Activity   • Alcohol use: Yes     Alcohol/week: 0.0 oz     Comment: occ   • Drug use: No   • Sexual activity: Not Currently     Partners: Male     Birth control/protection: Post-Menopausal     Comment: , retired   Other Topics Concern   • Not on file   Social History Narrative   • Not on file     Social Determinants of Health     Financial Resource Strain: Not on file   Food Insecurity: Not on file   Transportation Needs: Not on file   Physical Activity: Not on file   Stress: Not on file   Social Connections: Not on file   Intimate Partner Violence: Not on file   Housing Stability: Not on file     Allergies   Allergen Reactions   • Cipro Xr Hives   • Lipitor [Atorvastatin] Myalgia     Outpatient Encounter Medications as of 2022   Medication Sig Dispense Refill   • prednisoLONE acetate (PRED FORTE) 1 % Suspension      • aspirin EC (ECOTRIN) 81 MG TBEC Take 81 mg by mouth every day.     • [DISCONTINUED] tobramycin (TOBREX) 0.3 % Solution ophthalmic solution  (Patient not taking: Reported on 2022)    "    No facility-administered encounter medications on file as of 4/7/2022.     Review of Systems   Constitutional: Negative for chills and fever.   Respiratory: Negative for shortness of breath.    Cardiovascular: Negative for chest pain, palpitations, orthopnea, leg swelling and PND.   Gastrointestinal: Negative for abdominal pain and nausea.   Musculoskeletal: Negative for myalgias.   Neurological: Negative for dizziness, loss of consciousness and headaches.   Endo/Heme/Allergies: Does not bruise/bleed easily.   Psychiatric/Behavioral: The patient does not have insomnia.               Objective     /70 (BP Location: Left arm, Patient Position: Sitting, BP Cuff Size: Adult)   Pulse 77   Resp 14   Ht 1.676 m (5' 6\")   Wt 96.3 kg (212 lb 3.2 oz)   SpO2 93%   BMI 34.25 kg/m²     Physical Exam  Constitutional:       General: She is not in acute distress.     Appearance: She is well-developed. She is not diaphoretic.   HENT:      Head: Normocephalic.   Eyes:      General: No scleral icterus.  Neck:      Vascular: No JVD.   Cardiovascular:      Rate and Rhythm: Normal rate and regular rhythm.      Heart sounds: Murmur heard.    Systolic murmur is present with a grade of 2/6.    No friction rub. No gallop.      Comments: Sternotomy scar present.  Murmur at RUSB  Pulmonary:      Effort: Pulmonary effort is normal. No respiratory distress.      Breath sounds: Normal breath sounds. No wheezing or rales.   Abdominal:      General: Bowel sounds are normal. There is no distension.      Palpations: Abdomen is soft.      Tenderness: There is no abdominal tenderness.   Musculoskeletal:         General: Normal range of motion.      Cervical back: Normal range of motion and neck supple.   Skin:     General: Skin is warm and dry.      Findings: No rash.   Neurological:      Mental Status: She is alert and oriented to person, place, and time.       RESULTS OF ECHOCARDIOGRAM OF 6/5/2019:  Normal LV size  LVEF 40%  Global " hypokinesis  Normal RA, RV  Mildly dilated LA  Moderate MR  Normal function of prosthetic AV (COURTNEY 1.0cm2, mean 13.7mmHg, peak 23.1mmHg, Vmax 2.4m/s)  Mild TR      RESULTS OF OPERATION OF 4/25/2014:  POSTOPERATIVE DIAGNOSES:  Severe aortic stenosis (calcific or degenerative),   bicuspid aortic valve, congestive heart failure ([place] Heart Association   class II-III), chronic left ventricular systolic and diastolic failure, mild   mitral regurgitation, dyslipidemia.  PROCEDURE PERFORMED:  Aortic valve replacement (23 mm Hurst Perimount Magna   pericardial valve), aortic root enlargement (1.5 cm CorMatrix patch), and   intraoperative transesophageal echocardiography.    Lab Results   Component Value Date/Time    CHOLSTRLTOT 236 (H) 02/15/2022 07:42 AM     (H) 02/15/2022 07:42 AM    HDL 81 02/15/2022 07:42 AM    TRIGLYCERIDE 102 02/15/2022 07:42 AM       Lab Results   Component Value Date/Time    SODIUM 140 02/15/2022 07:42 AM    POTASSIUM 4.4 02/15/2022 07:42 AM    CHLORIDE 103 02/15/2022 07:42 AM    CO2 28 02/15/2022 07:42 AM    GLUCOSE 94 02/15/2022 07:42 AM    BUN 15 02/15/2022 07:42 AM    CREATININE 0.81 02/15/2022 07:42 AM     Lab Results   Component Value Date/Time    ALKPHOSPHAT 109 (H) 02/15/2022 07:42 AM    ASTSGOT 19 02/15/2022 07:42 AM    ALTSGPT 11 02/15/2022 07:42 AM    TBILIRUBIN 0.6 02/15/2022 07:42 AM      Lab Results   Component Value Date/Time    WBC 8.8 02/15/2022 07:42 AM    RBC 5.08 02/15/2022 07:42 AM    HEMOGLOBIN 15.1 02/15/2022 07:42 AM    HEMATOCRIT 46.9 02/15/2022 07:42 AM    MCV 92.3 02/15/2022 07:42 AM    MCH 29.7 02/15/2022 07:42 AM    MCHC 32.2 (L) 02/15/2022 07:42 AM    MPV 12.0 02/15/2022 07:42 AM        Assessment & Plan     1. S/P AVR (aortic valve replacement) and aortoplasty  EC-ECHOCARDIOGRAM COMPLETE W/O CONT   2. CHF (congestive heart failure), NYHA class II, chronic, systolic (HCC)  EC-ECHOCARDIOGRAM COMPLETE W/O CONT   3. LBBB (left bundle branch block)     4.  Dyslipidemia     5. Obesity (BMI 35.0-39.9 without comorbidity) (HCC)         Medical Decision Making: Today's Assessment/Status/Plan:      1. History of severe AS, status post AVR in April 2014. Echocardiogram in 2019 showed LVEF 40%, and normal function of valve. To repeat echo.    2. History of CHF, on ASA only. No shortness of breath or LE edema.    3. LBBB    4. Hyperlipidemia, declines statin therapy/intolerance. Lifestyle modifications.    5. Retinal/macular tear, right eye, followed by ophthalmology.    Continue ASA. Labs per PCP. Will obtain echo. Follow-up annually, sooner if clinical condition changes.

## 2023-01-27 ENCOUNTER — OFFICE VISIT (OUTPATIENT)
Dept: MEDICAL GROUP | Facility: PHYSICIAN GROUP | Age: 73
End: 2023-01-27
Payer: MEDICARE

## 2023-01-27 VITALS
HEIGHT: 66 IN | SYSTOLIC BLOOD PRESSURE: 124 MMHG | DIASTOLIC BLOOD PRESSURE: 80 MMHG | TEMPERATURE: 97.3 F | BODY MASS INDEX: 34.36 KG/M2 | OXYGEN SATURATION: 96 % | WEIGHT: 213.8 LBS | RESPIRATION RATE: 16 BRPM | HEART RATE: 76 BPM

## 2023-01-27 DIAGNOSIS — Z00.00 HEALTHCARE MAINTENANCE: ICD-10-CM

## 2023-01-27 DIAGNOSIS — H93.13 TINNITUS OF BOTH EARS: ICD-10-CM

## 2023-01-27 DIAGNOSIS — I50.22 CHF (CONGESTIVE HEART FAILURE), NYHA CLASS II, CHRONIC, SYSTOLIC (HCC): Chronic | ICD-10-CM

## 2023-01-27 DIAGNOSIS — E78.5 DYSLIPIDEMIA: Chronic | ICD-10-CM

## 2023-01-27 DIAGNOSIS — M85.80 OSTEOPENIA DETERMINED BY X-RAY: ICD-10-CM

## 2023-01-27 PROCEDURE — 99213 OFFICE O/P EST LOW 20 MIN: CPT | Performed by: NURSE PRACTITIONER

## 2023-01-27 ASSESSMENT — PATIENT HEALTH QUESTIONNAIRE - PHQ9: CLINICAL INTERPRETATION OF PHQ2 SCORE: 0

## 2023-01-27 ASSESSMENT — FIBROSIS 4 INDEX: FIB4 SCORE: 1.95

## 2023-01-27 NOTE — ASSESSMENT & PLAN NOTE
Chronic health problem.  Due for updated lipid profile. The 10-year ASCVD risk score (Gómez ROSA, et al., 2019) is: 11%  Strongly declines statin therapy.   no abdominal pain, no bloating, no constipation, no diarrhea, no nausea and no vomiting.

## 2023-01-27 NOTE — ASSESSMENT & PLAN NOTE
Again reviewed recommendations for screening mammogram, colon cancer screening, DEXA scan.  Patient declines all at this time.

## 2023-01-27 NOTE — PROGRESS NOTES
CC: Follow-up on chronic health problems,ringing in ears    HISTORY OF THE PRESENT ILLNESS: Patient is a 72 y.o. female. This pleasant patient is here today  for evaluation and management of the following health problems.        CHF (congestive heart failure), NYHA class II, chronic, systolic (HCC) EF 40%  Chronic health problem, managed by cardiology.  Sees cardiology annually.  Next appointment is in April.  Had repeat echo in 5/2022 without significant changes. Walking 20 minutes a day for exercise. Denies shortness of breath, chest pain, edema.    Dyslipidemia  Chronic health problem.  Due for updated lipid profile. The 10-year ASCVD risk score (Gómez ROSA, et al., 2019) is: 11%  Strongly declines statin therapy.    Osteopenia determined by x-ray  Declines repeat DEXA scan.  Last DEXA scan in 2017 which showed osteopenia.  Patient continues with calcium supplement with vitamin D.  Walking for exercise.    Healthcare maintenance  Again reviewed recommendations for screening mammogram, colon cancer screening, DEXA scan.  Patient declines all at this time.    Tinnitus of both ears  Patient reports ongoing tinnitus in bilateral ears, will left worsening.  Having crackling sound in right ear.  Denies otalgia, fever.  Denies allergies, though does get hayfever.    Allergies: Cipro xr and Lipitor [atorvastatin]    Current Outpatient Medications Ordered in Epic   Medication Sig Dispense Refill    aspirin EC (ECOTRIN) 81 MG TBEC Take 81 mg by mouth every day.      prednisoLONE acetate (PRED FORTE) 1 % Suspension        No current Epic-ordered facility-administered medications on file.       Past Medical History:   Diagnosis Date    Aortic stenosis 4/22/2014    Atrial fibrillation (HCC) 4/24/2014    Transient, perioperative and not recurrent    CHF (congestive heart failure), NYHA class II, chronic, systolic (HCC) EF 40%     Elevated BP     Hypertension     Mixed hyperlipidemia     S/P AVR (aortic valve replacement) and  "aortoplasty 2014    Dr. Rutledge,  23 mm Hurst Perimount Magna pericardial valve, aortic root enlargement (1.5 cm CorMatrix patch),          Past Surgical History:   Procedure Laterality Date    CATARACT EXTRACTION WITH IOL Bilateral 2021    AORTIC VALVE REPLACEMENT  2014    Performed by Lilly Rutledge M.D. at SURGERY [facility]    ORIF, WRIST  2010    Performed by OSGOOD, PATRICK J at SURGERY [facility]    TENDON REPAIR  2010    Performed by OSGOOD, PATRICK J at SURGERY [facility]    CYSTECTOMY  2007    Bracial Cleft cyst removed at [facility]       Social History     Tobacco Use    Smoking status: Former     Packs/day: 2.00     Years: 30.00     Pack years: 60.00     Types: Cigarettes     Quit date: 1996     Years since quittin.7    Smokeless tobacco: Never    Tobacco comments:     2 packs a day for at least 30 years    Vaping Use    Vaping Use: Never used   Substance Use Topics    Alcohol use: Yes     Alcohol/week: 0.0 oz     Comment: occ    Drug use: No       Family History   Problem Relation Age of Onset    Non-contributory Mother         basically healthy    Heart Attack Father 76        MI    Hypertension Father     Hyperlipidemia Father     Diabetes Paternal Grandfather 80        Type II Diabetes       ROS: As in HPI, otherwise negative for chest pain, dyspnea, abdominal pain, dysuria, blood in stool, fever        Exam: /80   Pulse 76   Temp 36.3 °C (97.3 °F) (Temporal)   Resp 16   Ht 1.676 m (5' 6\")   Wt 97 kg (213 lb 12.8 oz)   SpO2 96%  Body mass index is 34.51 kg/m².    General: Alert, pleasant, well nourished, well developed female in NAD  HEENT: Normocephalic. Eyes conjunctiva clear lids without ptosis, pupils equal and reactive to light, ears normal shape and contour.   Ear canals packed with dark cerumen.  Ear lavage performed by medical assistant, and copious amounts of cerumen removed.  After ear lavage, canals are pink and intact, clear of " cerumen.  Tympanic membranes are pearly gray with good light reflex, non bulging.   Nasal mucosa pink and edematous, no drainage.   Oropharynx is without erythema, edema or exudates.   Neck: Supple without bruit. Thyroid is not enlarged.  Pulmonary: Clear to ausculation.  Normal effort. No rales, ronchi, or wheezing.  Cardiovascular: Normal rate and rhythm with murmur. Carotid and radial pulses are intact and equal bilaterally.  No lower extremity edema.  Abdomen: Soft, nontender, nondistended.   Neurologic: Grossly nonfocal  Lymph: No cervical or supraclavicular lymph nodes are palpable  Skin: Warm and dry.    Musculoskeletal: Normal gait.   Psych: Normal mood and affect. Alert and oriented. Judgment and insight is normal.    Please note that this dictation was created using voice recognition software. I have made every reasonable attempt to correct obvious errors, but I expect that there are errors of grammar and possibly content that I did not discover before finalizing the note.      Assessment/Plan  1. Dyslipidemia  Reviewed ASCVD risk.  Patient strongly declines statin.  She will continue to work on lifestyle measures.  - Lipid Profile; Future    2. CHF (congestive heart failure), NYHA class II, chronic, systolic (HCC) EF 40%  Stable.  Continue annual follow-up with cardiology.  - Comp Metabolic Panel; Future  - ESTIMATED GFR; Future  - CBC WITHOUT DIFFERENTIAL; Future    3. Osteopenia determined by x-ray  Declines DEXA scan.  Continue with calcium and vitamin D supplementation.    4. Healthcare maintenance  Continues to decline all health care maintenance.    5. Tinnitus of both ears  Ear lavage performed by MA today.  Patient tolerated well.  She will see if this helps with crackling sensation in her right ear.  Nares are very edematous.  I recommend Flonase nasal spray in each nostril daily.  Patient will return to clinic if symptoms do not improve.  At that time can consider referral to ENT.    Patient  will return to clinic annually or sooner if needed.

## 2023-01-27 NOTE — ASSESSMENT & PLAN NOTE
Chronic health problem, managed by cardiology.  Sees cardiology annually.  Next appointment is in April.  Had repeat echo in 5/2022 without significant changes. Walking 20 minutes a day for exercise. Denies shortness of breath, chest pain, edema.

## 2023-01-27 NOTE — ASSESSMENT & PLAN NOTE
Patient reports ongoing tinnitus in bilateral ears, will left worsening.  Having crackling sound in right ear.  Denies otalgia, fever.  Denies allergies, though does get hayfever.

## 2023-02-14 ENCOUNTER — HOSPITAL ENCOUNTER (OUTPATIENT)
Dept: LAB | Facility: MEDICAL CENTER | Age: 73
End: 2023-02-14
Attending: NURSE PRACTITIONER
Payer: MEDICARE

## 2023-02-14 DIAGNOSIS — E78.5 DYSLIPIDEMIA: Chronic | ICD-10-CM

## 2023-02-14 DIAGNOSIS — I50.22 CHF (CONGESTIVE HEART FAILURE), NYHA CLASS II, CHRONIC, SYSTOLIC (HCC): Chronic | ICD-10-CM

## 2023-02-14 LAB
ALBUMIN SERPL BCP-MCNC: 4.4 G/DL (ref 3.2–4.9)
ALBUMIN/GLOB SERPL: 1.5 G/DL
ALP SERPL-CCNC: 97 U/L (ref 30–99)
ALT SERPL-CCNC: 14 U/L (ref 2–50)
ANION GAP SERPL CALC-SCNC: 9 MMOL/L (ref 7–16)
AST SERPL-CCNC: 19 U/L (ref 12–45)
BILIRUB SERPL-MCNC: 0.7 MG/DL (ref 0.1–1.5)
BUN SERPL-MCNC: 18 MG/DL (ref 8–22)
CALCIUM ALBUM COR SERPL-MCNC: 9.3 MG/DL (ref 8.5–10.5)
CALCIUM SERPL-MCNC: 9.6 MG/DL (ref 8.5–10.5)
CHLORIDE SERPL-SCNC: 104 MMOL/L (ref 96–112)
CHOLEST SERPL-MCNC: 250 MG/DL (ref 100–199)
CO2 SERPL-SCNC: 29 MMOL/L (ref 20–33)
CREAT SERPL-MCNC: 0.81 MG/DL (ref 0.5–1.4)
ERYTHROCYTE [DISTWIDTH] IN BLOOD BY AUTOMATED COUNT: 43.8 FL (ref 35.9–50)
FASTING STATUS PATIENT QL REPORTED: NORMAL
GFR SERPLBLD CREATININE-BSD FMLA CKD-EPI: 77 ML/MIN/1.73 M 2
GLOBULIN SER CALC-MCNC: 3 G/DL (ref 1.9–3.5)
GLUCOSE SERPL-MCNC: 92 MG/DL (ref 65–99)
HCT VFR BLD AUTO: 50.2 % (ref 37–47)
HDLC SERPL-MCNC: 73 MG/DL
HGB BLD-MCNC: 16 G/DL (ref 12–16)
LDLC SERPL CALC-MCNC: 153 MG/DL
MCH RBC QN AUTO: 29.4 PG (ref 27–33)
MCHC RBC AUTO-ENTMCNC: 31.9 G/DL (ref 33.6–35)
MCV RBC AUTO: 92.3 FL (ref 81.4–97.8)
PLATELET # BLD AUTO: 228 K/UL (ref 164–446)
PMV BLD AUTO: 11.8 FL (ref 9–12.9)
POTASSIUM SERPL-SCNC: 4.8 MMOL/L (ref 3.6–5.5)
PROT SERPL-MCNC: 7.4 G/DL (ref 6–8.2)
RBC # BLD AUTO: 5.44 M/UL (ref 4.2–5.4)
SODIUM SERPL-SCNC: 142 MMOL/L (ref 135–145)
TRIGL SERPL-MCNC: 120 MG/DL (ref 0–149)
WBC # BLD AUTO: 9.9 K/UL (ref 4.8–10.8)

## 2023-02-14 PROCEDURE — 85027 COMPLETE CBC AUTOMATED: CPT

## 2023-02-14 PROCEDURE — 80053 COMPREHEN METABOLIC PANEL: CPT

## 2023-02-14 PROCEDURE — 80061 LIPID PANEL: CPT

## 2023-02-14 PROCEDURE — 36415 COLL VENOUS BLD VENIPUNCTURE: CPT

## 2023-02-15 ENCOUNTER — TELEPHONE (OUTPATIENT)
Dept: MEDICAL GROUP | Facility: PHYSICIAN GROUP | Age: 73
End: 2023-02-15
Payer: MEDICARE

## 2023-02-15 DIAGNOSIS — E78.5 DYSLIPIDEMIA: Chronic | ICD-10-CM

## 2023-02-15 DIAGNOSIS — I50.22 CHF (CONGESTIVE HEART FAILURE), NYHA CLASS II, CHRONIC, SYSTOLIC (HCC): Chronic | ICD-10-CM

## 2023-02-15 DIAGNOSIS — Z95.2 S/P AVR (AORTIC VALVE REPLACEMENT) AND AORTOPLASTY: ICD-10-CM

## 2023-03-29 ENCOUNTER — TELEPHONE (OUTPATIENT)
Dept: HEALTH INFORMATION MANAGEMENT | Facility: OTHER | Age: 73
End: 2023-03-29

## 2023-04-13 ENCOUNTER — OFFICE VISIT (OUTPATIENT)
Dept: CARDIOLOGY | Facility: PHYSICIAN GROUP | Age: 73
End: 2023-04-13
Payer: MEDICARE

## 2023-04-13 VITALS
OXYGEN SATURATION: 93 % | RESPIRATION RATE: 12 BRPM | SYSTOLIC BLOOD PRESSURE: 120 MMHG | BODY MASS INDEX: 34.07 KG/M2 | HEIGHT: 66 IN | DIASTOLIC BLOOD PRESSURE: 70 MMHG | HEART RATE: 77 BPM | WEIGHT: 212 LBS

## 2023-04-13 DIAGNOSIS — E78.5 DYSLIPIDEMIA: Chronic | ICD-10-CM

## 2023-04-13 DIAGNOSIS — I50.22 CHF (CONGESTIVE HEART FAILURE), NYHA CLASS II, CHRONIC, SYSTOLIC (HCC): Chronic | ICD-10-CM

## 2023-04-13 DIAGNOSIS — Z95.2 S/P AVR (AORTIC VALVE REPLACEMENT) AND AORTOPLASTY: ICD-10-CM

## 2023-04-13 DIAGNOSIS — I44.7 LBBB (LEFT BUNDLE BRANCH BLOCK): ICD-10-CM

## 2023-04-13 DIAGNOSIS — E66.9 OBESITY (BMI 35.0-39.9 WITHOUT COMORBIDITY): ICD-10-CM

## 2023-04-13 PROCEDURE — 99214 OFFICE O/P EST MOD 30 MIN: CPT | Performed by: NURSE PRACTITIONER

## 2023-04-13 RX ORDER — AMOXICILLIN 500 MG/1
CAPSULE ORAL PRN
COMMUNITY
Start: 2023-02-24

## 2023-04-13 ASSESSMENT — ENCOUNTER SYMPTOMS
PND: 0
MYALGIAS: 0
NAUSEA: 0
DIZZINESS: 0
CHILLS: 0
HEADACHES: 0
BRUISES/BLEEDS EASILY: 0
FEVER: 0
ORTHOPNEA: 0
ABDOMINAL PAIN: 0
PALPITATIONS: 0
LOSS OF CONSCIOUSNESS: 0
INSOMNIA: 0
SHORTNESS OF BREATH: 0

## 2023-04-13 ASSESSMENT — FIBROSIS 4 INDEX: FIB4 SCORE: 1.603567451474546308

## 2023-04-13 NOTE — PROGRESS NOTES
Chief Complaint   Patient presents with    Follow-Up    CHF (Compensated)    Prosthetic Heart Valve/Issue    Hyperlipidemia       Subjective     Deana Doss is a 72 y.o. female who presents today for annual follow-up of AVR, post-operative AFib, and hyperlipidemia.    Deana is a 72 year old female with history of severe AS, status post AVR in 2014 with some arash-operative atrial fibrillation, but none since; she also has HFrEF (LVEF 40-45%), and  hyperlipidemia, but declines statins (or any other medical therapy). She was last seen by me in April 2022.     She is here today for annual follow-up. Since the last visit, she is doing well: no chest pain, pressure, tightness or discomfort; no palpitations; no shortness of breath, orthopnea or PND; no dizziness or syncope; no edema. BP is good at home, running 120-130 systolic. She continues to work on diet and exercise for management of her lipids; she declines medical treatment, including statins.    Past Medical History:   Diagnosis Date    Aortic stenosis 04/22/2014    AVR (23mm Hurst Perimount Magna pericardial valve, aortic root enlargement (1.5 cm CorMatrix patch)), by Dr. Rutledge, transient perioperative atrial fibrillation, no recurrence.    Atrial fibrillation (HCC) 04/24/2014    Transient, perioperative and not recurrent    CHF (congestive heart failure), NYHA class II, chronic, systolic ([facility]) EF 40-45% 05/2022    Echocardiogram with normal LV size, LVEF 40-45%. Normal RA, LA and RV. Prosthetic AV with normal function. Mild MR, mild TR. RVSP 37mmHg. Unchange from echo in 2019.    Hypertension     Mixed hyperlipidemia     S/P AVR (aortic valve replacement) and aortoplasty 04/24/2014    23mm Hurst Perimount Magna pericardial valve, aortic root enlargement (1.5 cm CorMatrix patch), by Dr. Rutledge.     Past Surgical History:   Procedure Laterality Date    CATARACT EXTRACTION WITH IOL Bilateral 12/2021    AORTIC VALVE REPLACEMENT  4/25/2014    Performed  by Lilly Rutledge M.D. at SURGERY [facility] ORS    ORIF, WRIST  2010    Performed by OSGOOD, PATRICK J at SURGERY [facility] ORS    TENDON REPAIR  2010    Performed by OSGOOD, PATRICK J at SURGERY [facility] ORS    CYSTECTOMY  2007    Bracial Cleft cyst removed at [facility]     Family History   Problem Relation Age of Onset    Non-contributory Mother         basically healthy    Heart Attack Father 76        MI    Hypertension Father     Hyperlipidemia Father     Diabetes Paternal Grandfather 80        Type II Diabetes     Social History     Socioeconomic History    Marital status:      Spouse name: Not on file    Number of children: Not on file    Years of education: Not on file    Highest education level: Not on file   Occupational History    Not on file   Tobacco Use    Smoking status: Former     Packs/day: 2.00     Years: 30.00     Pack years: 60.00     Types: Cigarettes     Quit date: 1996     Years since quittin.9    Smokeless tobacco: Never    Tobacco comments:     2 packs a day for at least 30 years    Vaping Use    Vaping Use: Never used   Substance and Sexual Activity    Alcohol use: Yes     Alcohol/week: 0.0 oz     Comment: occ    Drug use: No    Sexual activity: Not Currently     Partners: Male     Birth control/protection: Post-Menopausal     Comment: , retired   Other Topics Concern    Not on file   Social History Narrative    Not on file     Social Determinants of Health     Financial Resource Strain: Not on file   Food Insecurity: Not on file   Transportation Needs: Not on file   Physical Activity: Not on file   Stress: Not on file   Social Connections: Not on file   Intimate Partner Violence: Not on file   Housing Stability: Not on file     Allergies   Allergen Reactions    Cipro Xr Hives    Lipitor [Atorvastatin] Myalgia     Outpatient Encounter Medications as of 2023   Medication Sig Dispense Refill    amoxicillin (AMOXIL) 500 MG Cap as needed. PRN for Dental  "Procedures      aspirin EC (ECOTRIN) 81 MG TBEC Take 81 mg by mouth every day.       No facility-administered encounter medications on file as of 4/13/2023.     Review of Systems   Constitutional:  Negative for chills and fever.   Respiratory:  Negative for shortness of breath.    Cardiovascular:  Negative for chest pain, palpitations, orthopnea, leg swelling and PND.   Gastrointestinal:  Negative for abdominal pain and nausea.   Musculoskeletal:  Negative for myalgias.   Neurological:  Negative for dizziness, loss of consciousness and headaches.   Endo/Heme/Allergies:  Does not bruise/bleed easily.   Psychiatric/Behavioral:  The patient does not have insomnia.             Objective     /70 (BP Location: Left arm, Patient Position: Sitting, BP Cuff Size: Adult)   Pulse 77   Resp 12   Ht 1.676 m (5' 6\")   Wt 96.2 kg (212 lb)   SpO2 93%   BMI 34.22 kg/m²     Physical Exam  Constitutional:       General: She is not in acute distress.     Appearance: She is well-developed. She is not diaphoretic.   HENT:      Head: Normocephalic.   Eyes:      General: No scleral icterus.  Neck:      Vascular: No JVD.   Cardiovascular:      Rate and Rhythm: Normal rate and regular rhythm.      Heart sounds: Murmur heard.   Systolic murmur is present with a grade of 2/6.     No friction rub. No gallop.      Comments: Sternotomy scar present.  Murmur at RUSB  Pulmonary:      Effort: Pulmonary effort is normal. No respiratory distress.      Breath sounds: Normal breath sounds. No wheezing or rales.   Abdominal:      General: Bowel sounds are normal. There is no distension.      Palpations: Abdomen is soft.      Tenderness: There is no abdominal tenderness.   Musculoskeletal:         General: Normal range of motion.      Cervical back: Normal range of motion and neck supple.   Skin:     General: Skin is warm and dry.      Findings: No rash.   Neurological:      Mental Status: She is alert and oriented to person, place, and time. "     Echocardiogram of May 2022 ([facility]):  Normal LV size  LLVEF 40-45%  Prosthetic AV with normal function  Mild MR, mild TR  RVSP 37mmHg     RESULTS OF ECHOCARDIOGRAM OF 6/5/2019:  Normal LV size  LVEF 40%  Global hypokinesis  Normal RA, RV  Mildly dilated LA  Moderate MR  Normal function of prosthetic AV (COURTNEY 1.0cm2, mean 13.7mmHg, peak 23.1mmHg, Vmax 2.4m/s)  Mild TR      RESULTS OF OPERATION OF 4/25/2014:  POSTOPERATIVE DIAGNOSES:  Severe aortic stenosis (calcific or degenerative), bicuspid aortic valve, congestive heart failure (New York Heart Association class II-III), chronic left ventricular systolic and diastolic failure, mild mitral regurgitation, dyslipidemia.  PROCEDURE PERFORMED:  Aortic valve replacement (23 mm Hurst Perimount Magna pericardial valve), aortic root enlargement (1.5 cm CorMatrix patch), and intraoperative transesophageal echocardiography.     Lab Results   Component Value Date/Time    CHOLSTRLTOT 250 (H) 02/14/2023 07:38 AM     (H) 02/14/2023 07:38 AM    HDL 73 02/14/2023 07:38 AM    TRIGLYCERIDE 120 02/14/2023 07:38 AM        Lab Results   Component Value Date/Time    SODIUM 142 02/14/2023 07:38 AM    POTASSIUM 4.8 02/14/2023 07:38 AM    CHLORIDE 104 02/14/2023 07:38 AM    CO2 29 02/14/2023 07:38 AM    GLUCOSE 92 02/14/2023 07:38 AM    BUN 18 02/14/2023 07:38 AM    CREATININE 0.81 02/14/2023 07:38 AM      Lab Results   Component Value Date/Time    ASTSGOT 19 02/14/2023 07:38 AM    ALTSGPT 14 02/14/2023 07:38 AM         Assessment & Plan     1. S/P AVR (aortic valve replacement) and aortoplasty        2. CHF (congestive heart failure), NYHA class II, chronic, systolic ([facility]) EF 40%        3. LBBB (left bundle branch block)        4. Dyslipidemia        5. Obesity (BMI 35.0-39.9 without comorbidity) ([facility])            Medical Decision Making: Today's Assessment/Status/Plan:        1. History of severe AS, status post AVR in April 2014. Echocardiogram in 2022 showed LVEF 40-45%, and  normal function of valve. She declines any other medical therapy.     2. History of HFrEF, stage I, class II, LVEF 40-45%, on ASA only. No shortness of breath or LE edema, or other HF symptoms.      3. LBBB, stable.     4. Hyperlipidemia, declines statin therapy/intolerance. Lifestyle modifications including dietary change and exercise.     Continue ASA. Labs per PCP. Follow-up annually, sooner if clinical condition changes.

## 2023-05-23 ENCOUNTER — APPOINTMENT (RX ONLY)
Dept: URBAN - NONMETROPOLITAN AREA CLINIC 15 | Facility: CLINIC | Age: 73
Setting detail: DERMATOLOGY
End: 2023-05-23

## 2023-05-23 DIAGNOSIS — L81.4 OTHER MELANIN HYPERPIGMENTATION: ICD-10-CM

## 2023-05-23 DIAGNOSIS — D18.0 HEMANGIOMA: ICD-10-CM

## 2023-05-23 DIAGNOSIS — D22 MELANOCYTIC NEVI: ICD-10-CM

## 2023-05-23 DIAGNOSIS — L30.4 ERYTHEMA INTERTRIGO: ICD-10-CM

## 2023-05-23 DIAGNOSIS — L82.1 OTHER SEBORRHEIC KERATOSIS: ICD-10-CM

## 2023-05-23 PROBLEM — D22.72 MELANOCYTIC NEVI OF LEFT LOWER LIMB, INCLUDING HIP: Status: ACTIVE | Noted: 2023-05-23

## 2023-05-23 PROBLEM — D22.61 MELANOCYTIC NEVI OF RIGHT UPPER LIMB, INCLUDING SHOULDER: Status: ACTIVE | Noted: 2023-05-23

## 2023-05-23 PROBLEM — D22.71 MELANOCYTIC NEVI OF RIGHT LOWER LIMB, INCLUDING HIP: Status: ACTIVE | Noted: 2023-05-23

## 2023-05-23 PROBLEM — D22.62 MELANOCYTIC NEVI OF LEFT UPPER LIMB, INCLUDING SHOULDER: Status: ACTIVE | Noted: 2023-05-23

## 2023-05-23 PROBLEM — D22.5 MELANOCYTIC NEVI OF TRUNK: Status: ACTIVE | Noted: 2023-05-23

## 2023-05-23 PROBLEM — D18.01 HEMANGIOMA OF SKIN AND SUBCUTANEOUS TISSUE: Status: ACTIVE | Noted: 2023-05-23

## 2023-05-23 PROCEDURE — ? PRESCRIPTION

## 2023-05-23 PROCEDURE — 99203 OFFICE O/P NEW LOW 30 MIN: CPT

## 2023-05-23 PROCEDURE — ? LIQUID NITROGEN

## 2023-05-23 PROCEDURE — ? COUNSELING

## 2023-05-23 RX ORDER — KETOCONAZOLE 20 MG/G
CREAM TOPICAL BID
Qty: 60 | Refills: 6 | Status: ERX | COMMUNITY
Start: 2023-05-23

## 2023-05-23 RX ADMIN — KETOCONAZOLE: 20 CREAM TOPICAL at 00:00

## 2023-05-23 ASSESSMENT — LOCATION DETAILED DESCRIPTION DERM
LOCATION DETAILED: LEFT DISTAL DORSAL FOREARM
LOCATION DETAILED: LEFT CENTRAL MALAR CHEEK
LOCATION DETAILED: RIGHT DISTAL POSTERIOR THIGH
LOCATION DETAILED: LEFT INFERIOR LATERAL MIDBACK
LOCATION DETAILED: LEFT VENTRAL PROXIMAL FOREARM
LOCATION DETAILED: INFERIOR THORACIC SPINE
LOCATION DETAILED: MIDDLE STERNUM
LOCATION DETAILED: RIGHT INFERIOR LATERAL NECK
LOCATION DETAILED: RIGHT ANTERIOR DISTAL THIGH
LOCATION DETAILED: LEFT MEDIAL BREAST 6-7:00 REGION
LOCATION DETAILED: LEFT PROXIMAL PRETIBIAL REGION
LOCATION DETAILED: RIGHT POPLITEAL SKIN
LOCATION DETAILED: RIGHT CLAVICULAR NECK
LOCATION DETAILED: RIGHT ANTERIOR DISTAL UPPER ARM
LOCATION DETAILED: LEFT CENTRAL TEMPLE
LOCATION DETAILED: RIGHT ANTECUBITAL SKIN
LOCATION DETAILED: LEFT INFERIOR MEDIAL UPPER BACK
LOCATION DETAILED: RIGHT INFERIOR CENTRAL MALAR CHEEK
LOCATION DETAILED: LEFT MEDIAL FRONTAL SCALP
LOCATION DETAILED: LEFT ANTERIOR DISTAL UPPER ARM
LOCATION DETAILED: LEFT SUPERIOR MEDIAL MIDBACK
LOCATION DETAILED: RIGHT ANTERIOR PROXIMAL UPPER ARM
LOCATION DETAILED: LEFT VENTRAL LATERAL PROXIMAL FOREARM
LOCATION DETAILED: LEFT ANTERIOR PROXIMAL UPPER ARM
LOCATION DETAILED: LEFT CLAVICULAR NECK
LOCATION DETAILED: RIGHT INFERIOR FOREHEAD
LOCATION DETAILED: RIGHT SUPERIOR LATERAL NECK
LOCATION DETAILED: RIGHT CLAVICULAR SKIN
LOCATION DETAILED: RIGHT VENTRAL PROXIMAL FOREARM
LOCATION DETAILED: SUBXIPHOID
LOCATION DETAILED: LEFT INFRAMAMMARY CREASE (INNER QUADRANT)
LOCATION DETAILED: RIGHT INFERIOR LATERAL FOREHEAD
LOCATION DETAILED: LEFT POPLITEAL SKIN
LOCATION DETAILED: RIGHT INFERIOR LATERAL MALAR CHEEK
LOCATION DETAILED: LEFT DISTAL POSTERIOR THIGH
LOCATION DETAILED: RIGHT MEDIAL SUPERIOR CHEST
LOCATION DETAILED: LEFT LATERAL PROXIMAL PRETIBIAL REGION
LOCATION DETAILED: RIGHT UPPER CUTANEOUS LIP
LOCATION DETAILED: RIGHT PROXIMAL PRETIBIAL REGION
LOCATION DETAILED: RIGHT SUPERIOR LATERAL FOREHEAD
LOCATION DETAILED: LEFT ANTERIOR DISTAL THIGH
LOCATION DETAILED: XIPHOID
LOCATION DETAILED: RIGHT INFERIOR MEDIAL FOREHEAD

## 2023-05-23 ASSESSMENT — LOCATION SIMPLE DESCRIPTION DERM
LOCATION SIMPLE: UPPER BACK
LOCATION SIMPLE: RIGHT POPLITEAL SKIN
LOCATION SIMPLE: RIGHT CHEEK
LOCATION SIMPLE: LEFT POSTERIOR THIGH
LOCATION SIMPLE: LEFT UPPER ARM
LOCATION SIMPLE: CHEST
LOCATION SIMPLE: LEFT THIGH
LOCATION SIMPLE: LEFT SCALP
LOCATION SIMPLE: RIGHT PRETIBIAL REGION
LOCATION SIMPLE: RIGHT LIP
LOCATION SIMPLE: RIGHT UPPER ARM
LOCATION SIMPLE: RIGHT ANTERIOR NECK
LOCATION SIMPLE: LEFT LOWER BACK
LOCATION SIMPLE: LEFT TEMPLE
LOCATION SIMPLE: RIGHT POSTERIOR THIGH
LOCATION SIMPLE: LEFT UPPER BACK
LOCATION SIMPLE: LEFT FOREARM
LOCATION SIMPLE: ABDOMEN
LOCATION SIMPLE: LEFT POPLITEAL SKIN
LOCATION SIMPLE: RIGHT THIGH
LOCATION SIMPLE: RIGHT CLAVICULAR SKIN
LOCATION SIMPLE: RIGHT FOREHEAD
LOCATION SIMPLE: LEFT CHEEK
LOCATION SIMPLE: LEFT PRETIBIAL REGION
LOCATION SIMPLE: LEFT BREAST
LOCATION SIMPLE: RIGHT FOREARM
LOCATION SIMPLE: LEFT ANTERIOR NECK

## 2023-05-23 ASSESSMENT — LOCATION ZONE DERM
LOCATION ZONE: ARM
LOCATION ZONE: TRUNK
LOCATION ZONE: FACE
LOCATION ZONE: NECK
LOCATION ZONE: LIP
LOCATION ZONE: SCALP
LOCATION ZONE: LEG

## 2023-05-23 NOTE — PROCEDURE: LIQUID NITROGEN
Include Z78.9 (Other Specified Conditions Influencing Health Status) As An Associated Diagnosis?: No
Spray Paint Text: The liquid nitrogen was applied to the skin utilizing a spray paint frosting technique.
Show Spray Paint Technique Variable?: Yes
Duration Of Freeze Thaw-Cycle (Seconds): 0
Post-Care Instructions: I reviewed with the patient in detail post-care instructions. Patient is to wear sunprotection, and avoid picking at any of the treated lesions. Pt may apply Vaseline to crusted or scabbing areas.
Medical Necessity Clause: This procedure was medically necessary because the lesions that were treated were:  If lesion does not resolve, bx is needed.
Consent: The patient's consent was obtained including but not limited to risks of crusting, scabbing, blistering, scarring, darker or lighter pigmentary change, recurrence, incomplete removal and infection.
Detail Level: Detailed
Medical Necessity Information: It is in your best interest to select a reason for this procedure from the list below. All of these items fulfill various CMS LCD requirements except the new and changing color options.

## 2024-01-23 ENCOUNTER — HOSPITAL ENCOUNTER (OUTPATIENT)
Dept: LAB | Facility: MEDICAL CENTER | Age: 74
End: 2024-01-23
Attending: NURSE PRACTITIONER
Payer: MEDICARE

## 2024-01-23 DIAGNOSIS — Z95.2 S/P AVR (AORTIC VALVE REPLACEMENT) AND AORTOPLASTY: ICD-10-CM

## 2024-01-23 DIAGNOSIS — E78.5 DYSLIPIDEMIA: Chronic | ICD-10-CM

## 2024-01-23 DIAGNOSIS — I50.22 CHF (CONGESTIVE HEART FAILURE), NYHA CLASS II, CHRONIC, SYSTOLIC (HCC): Chronic | ICD-10-CM

## 2024-01-23 LAB
ALBUMIN SERPL BCP-MCNC: 4.3 G/DL (ref 3.2–4.9)
ALBUMIN/GLOB SERPL: 1.3 G/DL
ALP SERPL-CCNC: 105 U/L (ref 30–99)
ALT SERPL-CCNC: 16 U/L (ref 2–50)
ANION GAP SERPL CALC-SCNC: 12 MMOL/L (ref 7–16)
AST SERPL-CCNC: 21 U/L (ref 12–45)
BASOPHILS # BLD AUTO: 0.4 % (ref 0–1.8)
BASOPHILS # BLD: 0.03 K/UL (ref 0–0.12)
BILIRUB SERPL-MCNC: 0.7 MG/DL (ref 0.1–1.5)
BUN SERPL-MCNC: 12 MG/DL (ref 8–22)
CALCIUM ALBUM COR SERPL-MCNC: 9 MG/DL (ref 8.5–10.5)
CALCIUM SERPL-MCNC: 9.2 MG/DL (ref 8.5–10.5)
CHLORIDE SERPL-SCNC: 103 MMOL/L (ref 96–112)
CHOLEST SERPL-MCNC: 251 MG/DL (ref 100–199)
CO2 SERPL-SCNC: 26 MMOL/L (ref 20–33)
CREAT SERPL-MCNC: 0.74 MG/DL (ref 0.5–1.4)
EOSINOPHIL # BLD AUTO: 0.05 K/UL (ref 0–0.51)
EOSINOPHIL NFR BLD: 0.7 % (ref 0–6.9)
ERYTHROCYTE [DISTWIDTH] IN BLOOD BY AUTOMATED COUNT: 43.1 FL (ref 35.9–50)
FASTING STATUS PATIENT QL REPORTED: NORMAL
GFR SERPLBLD CREATININE-BSD FMLA CKD-EPI: 85 ML/MIN/1.73 M 2
GLOBULIN SER CALC-MCNC: 3.3 G/DL (ref 1.9–3.5)
GLUCOSE SERPL-MCNC: 93 MG/DL (ref 65–99)
HCT VFR BLD AUTO: 48.8 % (ref 37–47)
HDLC SERPL-MCNC: 82 MG/DL
HGB BLD-MCNC: 15.8 G/DL (ref 12–16)
IMM GRANULOCYTES # BLD AUTO: 0.02 K/UL (ref 0–0.11)
IMM GRANULOCYTES NFR BLD AUTO: 0.3 % (ref 0–0.9)
LDLC SERPL CALC-MCNC: 147 MG/DL
LYMPHOCYTES # BLD AUTO: 1.75 K/UL (ref 1–4.8)
LYMPHOCYTES NFR BLD: 25 % (ref 22–41)
MCH RBC QN AUTO: 29.8 PG (ref 27–33)
MCHC RBC AUTO-ENTMCNC: 32.4 G/DL (ref 32.2–35.5)
MCV RBC AUTO: 92.1 FL (ref 81.4–97.8)
MONOCYTES # BLD AUTO: 0.53 K/UL (ref 0–0.85)
MONOCYTES NFR BLD AUTO: 7.6 % (ref 0–13.4)
NEUTROPHILS # BLD AUTO: 4.61 K/UL (ref 1.82–7.42)
NEUTROPHILS NFR BLD: 66 % (ref 44–72)
NRBC # BLD AUTO: 0 K/UL
NRBC BLD-RTO: 0 /100 WBC (ref 0–0.2)
PLATELET # BLD AUTO: 217 K/UL (ref 164–446)
PMV BLD AUTO: 11.5 FL (ref 9–12.9)
POTASSIUM SERPL-SCNC: 4.3 MMOL/L (ref 3.6–5.5)
PROT SERPL-MCNC: 7.6 G/DL (ref 6–8.2)
RBC # BLD AUTO: 5.3 M/UL (ref 4.2–5.4)
SODIUM SERPL-SCNC: 141 MMOL/L (ref 135–145)
TRIGL SERPL-MCNC: 110 MG/DL (ref 0–149)
WBC # BLD AUTO: 7 K/UL (ref 4.8–10.8)

## 2024-01-23 PROCEDURE — 85025 COMPLETE CBC W/AUTO DIFF WBC: CPT

## 2024-01-23 PROCEDURE — 80053 COMPREHEN METABOLIC PANEL: CPT

## 2024-01-23 PROCEDURE — 36415 COLL VENOUS BLD VENIPUNCTURE: CPT

## 2024-01-23 PROCEDURE — 80061 LIPID PANEL: CPT

## 2024-02-28 ENCOUNTER — APPOINTMENT (RX ONLY)
Dept: URBAN - NONMETROPOLITAN AREA CLINIC 15 | Facility: CLINIC | Age: 74
Setting detail: DERMATOLOGY
End: 2024-02-28

## 2024-02-28 DIAGNOSIS — D18.0 HEMANGIOMA: ICD-10-CM

## 2024-02-28 DIAGNOSIS — L82.1 OTHER SEBORRHEIC KERATOSIS: ICD-10-CM

## 2024-02-28 DIAGNOSIS — D22 MELANOCYTIC NEVI: ICD-10-CM

## 2024-02-28 DIAGNOSIS — L81.4 OTHER MELANIN HYPERPIGMENTATION: ICD-10-CM

## 2024-02-28 PROBLEM — D22.5 MELANOCYTIC NEVI OF TRUNK: Status: ACTIVE | Noted: 2024-02-28

## 2024-02-28 PROBLEM — D18.01 HEMANGIOMA OF SKIN AND SUBCUTANEOUS TISSUE: Status: ACTIVE | Noted: 2024-02-28

## 2024-02-28 PROBLEM — D22.62 MELANOCYTIC NEVI OF LEFT UPPER LIMB, INCLUDING SHOULDER: Status: ACTIVE | Noted: 2024-02-28

## 2024-02-28 PROBLEM — D22.61 MELANOCYTIC NEVI OF RIGHT UPPER LIMB, INCLUDING SHOULDER: Status: ACTIVE | Noted: 2024-02-28

## 2024-02-28 PROCEDURE — 99213 OFFICE O/P EST LOW 20 MIN: CPT

## 2024-02-28 PROCEDURE — ? LIQUID NITROGEN

## 2024-02-28 PROCEDURE — ? COUNSELING

## 2024-02-28 ASSESSMENT — LOCATION DETAILED DESCRIPTION DERM
LOCATION DETAILED: RIGHT LATERAL ZYGOMA
LOCATION DETAILED: RIGHT MEDIAL BREAST 5-6:00 REGION
LOCATION DETAILED: LEFT MEDIAL BREAST 6-7:00 REGION
LOCATION DETAILED: RIGHT SUPERIOR LATERAL MALAR CHEEK
LOCATION DETAILED: RIGHT LATERAL FOREHEAD
LOCATION DETAILED: LEFT VENTRAL PROXIMAL FOREARM
LOCATION DETAILED: SUBXIPHOID
LOCATION DETAILED: LEFT LATERAL SUPERIOR CHEST
LOCATION DETAILED: LOWER STERNUM
LOCATION DETAILED: LEFT MEDIAL BREAST 7-8:00 REGION
LOCATION DETAILED: RIGHT INFERIOR LATERAL FOREHEAD
LOCATION DETAILED: RIGHT CENTRAL ZYGOMA
LOCATION DETAILED: LEFT UPPER CUTANEOUS LIP
LOCATION DETAILED: LEFT INFERIOR MEDIAL MALAR CHEEK
LOCATION DETAILED: RIGHT RIB CAGE
LOCATION DETAILED: LEFT RIB CAGE
LOCATION DETAILED: RIGHT ANTECUBITAL SKIN
LOCATION DETAILED: LEFT CLAVICULAR SKIN
LOCATION DETAILED: LEFT INFERIOR MEDIAL FOREHEAD
LOCATION DETAILED: LEFT ANTECUBITAL SKIN
LOCATION DETAILED: RIGHT INFRAMAMMARY CREASE (INNER QUADRANT)
LOCATION DETAILED: LEFT INFERIOR LATERAL FOREHEAD
LOCATION DETAILED: RIGHT ANTERIOR DISTAL UPPER ARM
LOCATION DETAILED: LEFT PHILTRAL RIDGE
LOCATION DETAILED: RIGHT CENTRAL BUCCAL CHEEK
LOCATION DETAILED: LEFT MEDIAL SUPERIOR CHEST
LOCATION DETAILED: RIGHT LATERAL BREAST 6-7:00 REGION
LOCATION DETAILED: RIGHT MEDIAL UPPER BACK
LOCATION DETAILED: RIGHT VENTRAL PROXIMAL FOREARM
LOCATION DETAILED: RIGHT INFERIOR MEDIAL UPPER BACK
LOCATION DETAILED: RIGHT FOREHEAD
LOCATION DETAILED: RIGHT SUPERIOR LATERAL FOREHEAD
LOCATION DETAILED: LEFT INFRAMAMMARY CREASE (INNER QUADRANT)
LOCATION DETAILED: PHILTRUM
LOCATION DETAILED: LEFT CLAVICULAR NECK
LOCATION DETAILED: RIGHT INFERIOR UPPER BACK
LOCATION DETAILED: INFERIOR MID FOREHEAD
LOCATION DETAILED: RIGHT INFERIOR POSTAURICULAR SKIN
LOCATION DETAILED: SUPERIOR LUMBAR SPINE
LOCATION DETAILED: LEFT ANTERIOR SHOULDER
LOCATION DETAILED: PERIUMBILICAL SKIN

## 2024-02-28 ASSESSMENT — LOCATION SIMPLE DESCRIPTION DERM
LOCATION SIMPLE: LEFT BREAST
LOCATION SIMPLE: LOWER BACK
LOCATION SIMPLE: RIGHT FOREHEAD
LOCATION SIMPLE: RIGHT BREAST
LOCATION SIMPLE: ABDOMEN
LOCATION SIMPLE: LEFT CHEEK
LOCATION SIMPLE: SCALP
LOCATION SIMPLE: RIGHT ZYGOMA
LOCATION SIMPLE: RIGHT CHEEK
LOCATION SIMPLE: LEFT CLAVICULAR SKIN
LOCATION SIMPLE: LEFT UPPER ARM
LOCATION SIMPLE: LEFT LIP
LOCATION SIMPLE: RIGHT UPPER BACK
LOCATION SIMPLE: CHEST
LOCATION SIMPLE: LEFT FOREARM
LOCATION SIMPLE: INFERIOR FOREHEAD
LOCATION SIMPLE: RIGHT UPPER ARM
LOCATION SIMPLE: RIGHT FOREARM
LOCATION SIMPLE: LEFT ANTERIOR NECK
LOCATION SIMPLE: LEFT SHOULDER
LOCATION SIMPLE: LEFT FOREHEAD
LOCATION SIMPLE: UPPER LIP

## 2024-02-28 ASSESSMENT — LOCATION ZONE DERM
LOCATION ZONE: TRUNK
LOCATION ZONE: FACE
LOCATION ZONE: ARM
LOCATION ZONE: LIP
LOCATION ZONE: NECK
LOCATION ZONE: SCALP

## 2024-02-28 NOTE — PROCEDURE: LIQUID NITROGEN
Render Note In Bullet Format When Appropriate: No
Consent: The patient's consent was obtained including but not limited to risks of crusting, scabbing, blistering, scarring, darker or lighter pigmentary change, recurrence, incomplete removal and infection.
Spray Paint Text: The liquid nitrogen was applied to the skin utilizing a spray paint frosting technique.
Medical Necessity Clause: This procedure was medically necessary because the lesions that were treated were:  If lesion does not resolve, bx is needed.
Detail Level: Detailed
Medical Necessity Information: It is in your best interest to select a reason for this procedure from the list below. All of these items fulfill various CMS LCD requirements except the new and changing color options.
Post-Care Instructions: I reviewed with the patient in detail post-care instructions. Patient is to wear sunprotection, and avoid picking at any of the treated lesions. Pt may apply Vaseline to crusted or scabbing areas.
Duration Of Freeze Thaw-Cycle (Seconds): 0
Show Spray Paint Technique Variable?: Yes

## 2024-04-19 ENCOUNTER — OFFICE VISIT (OUTPATIENT)
Dept: CARDIOLOGY | Facility: PHYSICIAN GROUP | Age: 74
End: 2024-04-19
Payer: MEDICARE

## 2024-04-19 VITALS
RESPIRATION RATE: 12 BRPM | DIASTOLIC BLOOD PRESSURE: 76 MMHG | SYSTOLIC BLOOD PRESSURE: 118 MMHG | HEIGHT: 66 IN | HEART RATE: 81 BPM | BODY MASS INDEX: 34.55 KG/M2 | WEIGHT: 215 LBS | OXYGEN SATURATION: 94 %

## 2024-04-19 DIAGNOSIS — E78.5 DYSLIPIDEMIA: Chronic | ICD-10-CM

## 2024-04-19 DIAGNOSIS — Z95.2 S/P AVR (AORTIC VALVE REPLACEMENT) AND AORTOPLASTY: ICD-10-CM

## 2024-04-19 DIAGNOSIS — I50.22 CHF (CONGESTIVE HEART FAILURE), NYHA CLASS II, CHRONIC, SYSTOLIC (HCC): Chronic | ICD-10-CM

## 2024-04-19 DIAGNOSIS — I44.7 LBBB (LEFT BUNDLE BRANCH BLOCK): ICD-10-CM

## 2024-04-19 DIAGNOSIS — E66.9 OBESITY (BMI 35.0-39.9 WITHOUT COMORBIDITY): ICD-10-CM

## 2024-04-19 PROCEDURE — 3078F DIAST BP <80 MM HG: CPT | Performed by: NURSE PRACTITIONER

## 2024-04-19 PROCEDURE — 99214 OFFICE O/P EST MOD 30 MIN: CPT | Performed by: NURSE PRACTITIONER

## 2024-04-19 PROCEDURE — 3074F SYST BP LT 130 MM HG: CPT | Performed by: NURSE PRACTITIONER

## 2024-04-19 ASSESSMENT — ENCOUNTER SYMPTOMS
ABDOMINAL PAIN: 0
MYALGIAS: 0
FEVER: 0
ORTHOPNEA: 0
CHILLS: 0
PND: 0
SHORTNESS OF BREATH: 0
NAUSEA: 0
DIZZINESS: 0
BRUISES/BLEEDS EASILY: 0
HEADACHES: 0
PALPITATIONS: 0
INSOMNIA: 0
LOSS OF CONSCIOUSNESS: 0

## 2024-04-19 ASSESSMENT — FIBROSIS 4 INDEX: FIB4 SCORE: 1.77

## 2024-04-19 NOTE — PROGRESS NOTES
Called the [facility]  pharmacy regarding the medications- Entresto and Jardiance both need a prior auth   Key completed for Jardiance through [employer]  KEY: [id number]   Await for follow up   Completed prior auth for Entresto   KEY: [id number]  Entresto has been approved    Chief Complaint   Patient presents with    Follow-Up    Prosthetic Heart Valve/Issue    CHF (Compensated)    Hyperlipidemia       Subjective     Deana Doss is a 73 y.o. female who presents today annual follow-up of AVR, HFrEF and hyperlipidemia.    Deana is a 73 year old female with history of severe AS, status post AVR in  with some arash-operative atrial fibrillation, but none since; she also has HFrEF (LVEF 40-45%), and  hyperlipidemia, but declines statins or any other medical therapy. She was last seen by me in 2023.     She is here today for annual follow-up. She had a somewhat stressful year. Her mother  of cancer in the fall of ; her  has had some health issues too.    Overall though, she is doing well: no chest pain, pressure, tightness or discomfort; no palpitations; no shortness of breath, orthopnea or PND; no dizziness or syncope; no edema. BP is good at home, running 120-130 systolic. She continues to work on diet and exercise for management of her lipids; she declines medical treatment, including statins.       Past Medical History:   Diagnosis Date    Aortic stenosis 2014    AVR (23mm Hurst Perimount Magna pericardial valve, aortic root enlargement (1.5 cm CorMatrix patch)), by Dr. Rutledge, transient perioperative atrial fibrillation, no recurrence.    Atrial fibrillation (HCC) 2014    Transient, perioperative and not recurrent    CHF (congestive heart failure), NYHA class II, chronic, systolic ([facility]) EF 40-45% 2022    Echocardiogram with normal LV size, LVEF 40-45%. Normal RA, LA and RV. Prosthetic AV with normal function. Mild MR, mild TR. RVSP 37mmHg. Unchange from echo in 2019.    Hypertension     Mixed hyperlipidemia     S/P AVR (aortic valve replacement) and aortoplasty 2014    23mm Hurst Perimount Magna pericardial valve, aortic root enlargement (1.5 cm CorMatrix patch), by Dr. Rutledge.     Past Surgical History:   Procedure Laterality  Date    CATARACT EXTRACTION WITH IOL Bilateral 2021    AORTIC VALVE REPLACEMENT  2014    Performed by Lilly Rutledge M.D. at SURGERY [facility] ORS    ORIF, WRIST  2010    Performed by OSGOOD, PATRICK J at SURGERY [facility]    TENDON REPAIR  2010    Performed by OSGOOD, PATRICK J at SURGERY [facility]    CYSTECTOMY  2007    Bracial Cleft cyst removed at [facility]     Family History   Problem Relation Age of Onset    Non-contributory Mother         basically healthy    Heart Attack Father 76        MI    Hypertension Father     Hyperlipidemia Father     Diabetes Paternal Grandfather 80        Type II Diabetes     Social History     Socioeconomic History    Marital status:      Spouse name: Not on file    Number of children: Not on file    Years of education: Not on file    Highest education level: Not on file   Occupational History    Not on file   Tobacco Use    Smoking status: Former     Current packs/day: 0.00     Average packs/day: 2.0 packs/day for 30.0 years (60.0 ttl pk-yrs)     Types: Cigarettes     Start date: 1966     Quit date: 1996     Years since quittin.0    Smokeless tobacco: Never    Tobacco comments:     2 packs a day for at least 30 years    Vaping Use    Vaping Use: Never used   Substance and Sexual Activity    Alcohol use: Yes     Alcohol/week: 0.0 oz     Comment: occ    Drug use: No    Sexual activity: Not Currently     Partners: Male     Birth control/protection: Post-Menopausal     Comment: , retired   Other Topics Concern    Not on file   Social History Narrative    Not on file     Social Determinants of Health     Financial Resource Strain: Not on file   Food Insecurity: Not on file   Transportation Needs: Not on file   Physical Activity: Not on file   Stress: Not on file   Social Connections: Not on file   Intimate Partner Violence: Not on file   Housing Stability: Not on file     Allergies   Allergen Reactions    Cipro Xr Hives     "Lipitor [Atorvastatin] Myalgia     Outpatient Encounter Medications as of 4/19/2024   Medication Sig Dispense Refill    amoxicillin (AMOXIL) 500 MG Cap as needed. PRN for Dental Procedures      aspirin EC (ECOTRIN) 81 MG TBEC Take 81 mg by mouth every day.       No facility-administered encounter medications on file as of 4/19/2024.     Review of Systems   Constitutional:  Negative for chills and fever.   Respiratory:  Negative for shortness of breath.    Cardiovascular:  Negative for chest pain, palpitations, orthopnea, leg swelling and PND.   Gastrointestinal:  Negative for abdominal pain and nausea.   Musculoskeletal:  Negative for myalgias.   Neurological:  Negative for dizziness, loss of consciousness and headaches.   Endo/Heme/Allergies:  Does not bruise/bleed easily.   Psychiatric/Behavioral:  The patient does not have insomnia.               Objective     /76 (BP Location: Left arm, Patient Position: Sitting, BP Cuff Size: Adult)   Pulse 81   Resp 12   Ht 1.676 m (5' 6\")   Wt 97.5 kg (215 lb)   SpO2 94%   BMI 34.70 kg/m²     Physical Exam  Constitutional:       General: She is not in acute distress.     Appearance: She is well-developed. She is not diaphoretic.   HENT:      Head: Normocephalic.   Eyes:      General: No scleral icterus.  Neck:      Vascular: No JVD.   Cardiovascular:      Rate and Rhythm: Normal rate and regular rhythm.      Heart sounds: Murmur heard.      Systolic murmur is present with a grade of 2/6.      No friction rub. No gallop.      Comments: Sternotomy scar present.  Murmur at RUSB  Pulmonary:      Effort: Pulmonary effort is normal. No respiratory distress.      Breath sounds: Normal breath sounds. No wheezing or rales.   Abdominal:      General: Bowel sounds are normal. There is no distension.      Palpations: Abdomen is soft.      Tenderness: There is no abdominal tenderness.   Musculoskeletal:         General: Normal range of motion.      Cervical back: Normal range " of motion and neck supple.   Skin:     General: Skin is warm and dry.      Findings: No rash.   Neurological:      Mental Status: She is alert and oriented to person, place, and time.     Echocardiogram of May 2022 ([facility]):  Normal LV size  LVEF 40-45%  Prosthetic AV with normal function  Mild MR, mild TR  RVSP 37mmHg     RESULTS OF ECHOCARDIOGRAM OF 6/5/2019:  Normal LV size  LVEF 40%  Global hypokinesis  Normal RA, RV  Mildly dilated LA  Moderate MR  Normal function of prosthetic AV (COURTNEY 1.0cm2, mean 13.7mmHg, peak 23.1mmHg, Vmax 2.4m/s)  Mild TR      RESULTS OF OPERATION OF 4/25/2014:  POSTOPERATIVE DIAGNOSES:  Severe aortic stenosis (calcific or degenerative), bicuspid aortic valve, congestive heart failure (New York Heart Association class II-III), chronic left ventricular systolic and diastolic failure, mild mitral regurgitation, dyslipidemia.  PROCEDURE PERFORMED:  Aortic valve replacement (23 mm Hurst Perimount Magna pericardial valve), aortic root enlargement (1.5 cm CorMatrix patch), and intraoperative transesophageal echocardiography.       Lab Results   Component Value Date/Time    CHOLSTRLTOT 251 (H) 01/23/2024 07:11 AM     (H) 01/23/2024 07:11 AM    HDL 82 01/23/2024 07:11 AM    TRIGLYCERIDE 110 01/23/2024 07:11 AM        Lab Results   Component Value Date/Time    ASTSGOT 21 01/23/2024 07:11 AM    ALTSGPT 16 01/23/2024 07:11 AM       Lab Results   Component Value Date/Time    SODIUM 141 01/23/2024 07:11 AM    POTASSIUM 4.3 01/23/2024 07:11 AM    CHLORIDE 103 01/23/2024 07:11 AM    CO2 26 01/23/2024 07:11 AM    GLUCOSE 93 01/23/2024 07:11 AM    BUN 12 01/23/2024 07:11 AM    CREATININE 0.74 01/23/2024 07:11 AM          Assessment & Plan     1. S/P AVR (aortic valve replacement) and aortoplasty  EC-ECHOCARDIOGRAM COMPLETE W/O CONT      2. CHF (congestive heart failure), NYHA class II, chronic, systolic (HCC) EF 40%        3. LBBB (left bundle branch block)        4. Dyslipidemia        5. Obesity  (BMI 35.0-39.9 without comorbidity) ([facility])            Medical Decision Making: Today's Assessment/Status/Plan:        1. History of severe AS, status post AVR in April 2014. Echocardiogram in 2022 showed LVEF 40-45%, and normal function of valve. She declines any other medical therapy. We will repeat echo.     2. History of HFrEF, stage I, class II, LVEF 40-45%, on ASA only. No shortness of breath or LE edema, or other HF symptoms. She declines any medical therapy. As above, we will repeat echo.     3. LBBB, stable.     4. Hyperlipidemia, declines statin therapy/intolerance. Lifestyle modifications including dietary change and exercise.     Continue ASA. Antibiotics prophylaxis for dental procedures.     We will repeat echocardiogram. Labs per PCP.     Follow-up annually, sooner if clinical condition changes.

## 2024-04-23 NOTE — PROCEDURE: COUNSELING
Detail Level: Detailed Depth Of Biopsy: dermis Detail Level: Zone Was A Bandage Applied: Yes Size Of Lesion In Cm: 0 Biopsy Type: H and E Biopsy Method: Personna blade Anesthesia Type: 1% lidocaine with epinephrine and a 1:10 solution of 8.4% sodium bicarbonate Anesthesia Volume In Cc: 0.5 Hemostasis: Drysol Wound Care: Petrolatum Dressing: bandage Destruction After The Procedure: No Type Of Destruction Used: Curettage Curettage Text: The wound bed was treated with curettage after the biopsy was performed. Cryotherapy Text: The wound bed was treated with cryotherapy after the biopsy was performed. Electrodesiccation Text: The wound bed was treated with electrodesiccation after the biopsy was performed. Electrodesiccation And Curettage Text: The wound bed was treated with electrodesiccation and curettage after the biopsy was performed. Silver Nitrate Text: The wound bed was treated with silver nitrate after the biopsy was performed. Lab: [id number] Lab Facility: [id number] Consent: Verbal consent was obtained and risks were reviewed including but not limited to scarring, infection, bleeding, scabbing, incomplete removal, nerve damage and allergy to anesthesia. Post-Care Instructions: I reviewed with the patient in detail post-care instructions. Patient is to keep the biopsy site dry overnight, and then apply bacitracin twice daily until healed. Patient may apply hydrogen peroxide soaks to remove any crusting. Notification Instructions: Patient will be notified of biopsy results. However, patient instructed to call the office if not contacted within 2 weeks. Billing Type: Third-Party Bill Information: Selecting Yes will display possible errors in your note based on the variables you have selected. This validation is only offered as a suggestion for you. PLEASE NOTE THAT THE VALIDATION TEXT WILL BE REMOVED WHEN YOU FINALIZE YOUR NOTE. IF YOU WANT TO FAX A PRELIMINARY NOTE YOU WILL NEED TO TOGGLE THIS TO 'NO' IF YOU DO NOT WANT IT IN YOUR FAXED NOTE.

## 2024-05-21 ENCOUNTER — TELEPHONE (OUTPATIENT)
Dept: CARDIOLOGY | Facility: MEDICAL CENTER | Age: 74
End: 2024-05-21
Payer: MEDICARE

## 2024-05-21 DIAGNOSIS — I50.22 CHF (CONGESTIVE HEART FAILURE), NYHA CLASS II, CHRONIC, SYSTOLIC (HCC): ICD-10-CM

## 2024-05-21 DIAGNOSIS — Z95.2 S/P AVR (AORTIC VALVE REPLACEMENT) AND AORTOPLASTY: ICD-10-CM

## 2024-05-21 RX ORDER — LOSARTAN POTASSIUM 25 MG/1
12.5 TABLET ORAL DAILY
Qty: 15 TABLET | Refills: 2 | Status: SHIPPED | OUTPATIENT
Start: 2024-05-21

## 2024-05-21 RX ORDER — CARVEDILOL 3.12 MG/1
3.12 TABLET ORAL 2 TIMES DAILY WITH MEALS
Qty: 60 TABLET | Refills: 2 | Status: SHIPPED | OUTPATIENT
Start: 2024-05-21

## 2024-05-21 NOTE — TELEPHONE ENCOUNTER
Phone Number Called: [phone]     Call outcome: Spoke to patient regarding message below.    Message: Called to relay AB recommendations. She does her echocardiogram at [facility], advised will fax order to them. Answered all questions and concerns, appreciative of call.     RX's ordered.   Echocardiogram ordered.     AB Care Team: Please fax echo order to [facility]. Thank you!

## 2024-05-21 NOTE — TELEPHONE ENCOUNTER
Would start Coreg 3.125mg twice daily, along with Losartan 12.5mg once daily. (These are the lowest doses possible).    We will consider repeating another echo in 3-6 months to see if there is any improvement.    If she continues to have shortness of breath (feeling winded), we might add low dose diuretic too.    Thanks, AB

## 2024-05-21 NOTE — TELEPHONE ENCOUNTER
"Phone Number Called: [phone]    Call outcome: Spoke to patient regarding message below.    Message: Called to discuss results and AB recommendation. When it was warmer out, she was \"getting winded\". She is agreeable to trying medications and is still taking ASA.  Advised will let her know as soon as we get prescription information and confirmed use of [facility] as pharmacy. Answered all questions and concerns, appreciative of call.     AB: Patient agreeable to starting medication, please advise on medication, dosage and frequency. Thanks!    "

## 2024-05-21 NOTE — TELEPHONE ENCOUNTER
----- Message from DAHIANA Ferreira sent at 5/21/2024 12:57 PM PDT -----  Echo shows that LVEF is now 35%. Prosthetic aortic valve is working normally.  She has declined medical therapy in the past - she is on ASA only.  Does she still decline any therapy?    At this point, would recommend small dose beta blocker and ARB therapy to help LVEF.  Thanks, AB

## 2024-07-26 ENCOUNTER — TELEPHONE (OUTPATIENT)
Dept: CARDIOLOGY | Facility: MEDICAL CENTER | Age: 74
End: 2024-07-26
Payer: MEDICARE

## 2024-08-19 ENCOUNTER — TELEPHONE (OUTPATIENT)
Dept: CARDIOLOGY | Facility: MEDICAL CENTER | Age: 74
End: 2024-08-19
Payer: MEDICARE

## 2024-08-19 DIAGNOSIS — I50.22 CHF (CONGESTIVE HEART FAILURE), NYHA CLASS II, CHRONIC, SYSTOLIC (HCC): ICD-10-CM

## 2024-08-19 RX ORDER — CARVEDILOL 3.12 MG/1
3.12 TABLET ORAL 2 TIMES DAILY WITH MEALS
Qty: 60 TABLET | Refills: 0 | Status: SHIPPED | OUTPATIENT
Start: 2024-08-19

## 2024-08-19 RX ORDER — LOSARTAN POTASSIUM 25 MG/1
12.5 TABLET ORAL DAILY
Qty: 15 TABLET | Refills: 0 | Status: SHIPPED | OUTPATIENT
Start: 2024-08-19

## 2024-08-19 NOTE — TELEPHONE ENCOUNTER
AB    Caller: Deana Doss    Topic/issue: Patient states she was put on a short term amount of medication until she got her Echo done, but the Echo is scheduled at [facility] in [place] and not until 09/10/24. Patient will run out of medication and asking what Aurelia would like her to do?    Medications: losartan and carvedilol    Preferred pharmacy: [facility]    Amount remaining: About 1.5 weeks worth    Callback Number: [phone] (home)     Thank you,  Beckie PENN

## 2024-09-20 DIAGNOSIS — I50.22 CHF (CONGESTIVE HEART FAILURE), NYHA CLASS II, CHRONIC, SYSTOLIC (HCC): ICD-10-CM

## 2024-09-24 ENCOUNTER — TELEPHONE (OUTPATIENT)
Dept: CARDIOLOGY | Facility: MEDICAL CENTER | Age: 74
End: 2024-09-24
Payer: MEDICARE

## 2024-09-25 NOTE — TELEPHONE ENCOUNTER
Phone Number Called: [phone]     Call outcome: Spoke to patient regarding message below.    Message: Called to inform patient of AB recommendations. Patient reports she does not want to pursue an ICD at this time and wants to continue with her current medication regimen. ER precautions advised. Patient verbalized understanding. Denies any symptoms at this time. RN to inform AB.       ----- Message from Nurse Practitioner DAHIANA Ferreira sent at 9/24/2024  9:04 AM PDT -----  Her echo shows LVEF 35% (previously 40-45%). She is on low dose Coreg and Losartan. Would recommend she continue with this, and we consider referral to EP to discuss possible AICD.  Historically she has declined further therapy - I'm happy to give her a referral, but not sure she wants to pursue this.  Thanks, AB

## 2024-09-26 DIAGNOSIS — I50.22 CHF (CONGESTIVE HEART FAILURE), NYHA CLASS II, CHRONIC, SYSTOLIC (HCC): ICD-10-CM

## 2024-09-26 RX ORDER — LOSARTAN POTASSIUM 25 MG/1
12.5 TABLET ORAL DAILY
Qty: 45 TABLET | Refills: 0 | Status: SHIPPED | OUTPATIENT
Start: 2024-09-26

## 2024-09-26 RX ORDER — CARVEDILOL 3.12 MG/1
3.12 TABLET ORAL 2 TIMES DAILY WITH MEALS
Qty: 180 TABLET | Refills: 0 | Status: SHIPPED | OUTPATIENT
Start: 2024-09-26

## 2024-09-26 NOTE — TELEPHONE ENCOUNTER
AB    Received request via: Patient    Was the patient seen in the last year in this department? Yes 4/19/24    Does the patient have an active prescription (recently filled or refills available) for medication(s) requested? YES    Pharmacy Name: [facility] [place] [facility] - [place], [place] - [address] [[id number]]     Does the patient have snf Plus and need 100-day supply? (This applies to ALL medications) Patient does not have SCP     Thank you,  Sarath TSANG

## 2024-09-26 NOTE — TELEPHONE ENCOUNTER
---- Message from Nurse Practitioner DAHIANA Ferreira sent at 9/24/2024  9:04 AM PDT -----  Her echo shows LVEF 35% (previously 40-45%). She is on low dose Coreg and Losartan. Would recommend she continue with this, and we consider referral to EP to discuss possible AICD.  Historically she has declined further therapy - I'm happy to give her a referral, but not sure she wants to pursue this.  Thanks, AB    Noted previous recommendations above per AB. RXs renewed. BMP lab order placed per RX protocol and [facility] message sent to pt.

## 2024-12-04 ENCOUNTER — APPOINTMENT (OUTPATIENT)
Dept: URBAN - NONMETROPOLITAN AREA CLINIC 15 | Facility: CLINIC | Age: 74
Setting detail: DERMATOLOGY
End: 2024-12-04

## 2024-12-04 DIAGNOSIS — L81.4 OTHER MELANIN HYPERPIGMENTATION: ICD-10-CM

## 2024-12-04 DIAGNOSIS — L82.1 OTHER SEBORRHEIC KERATOSIS: ICD-10-CM

## 2024-12-04 DIAGNOSIS — L57.0 ACTINIC KERATOSIS: ICD-10-CM

## 2024-12-04 PROCEDURE — ? COUNSELING

## 2024-12-04 PROCEDURE — 99212 OFFICE O/P EST SF 10 MIN: CPT | Mod: 25

## 2024-12-04 PROCEDURE — 17000 DESTRUCT PREMALG LESION: CPT

## 2024-12-04 PROCEDURE — 17003 DESTRUCT PREMALG LES 2-14: CPT

## 2024-12-04 PROCEDURE — ? LIQUID NITROGEN

## 2024-12-04 ASSESSMENT — LOCATION DETAILED DESCRIPTION DERM
LOCATION DETAILED: RIGHT MEDIAL FOREHEAD
LOCATION DETAILED: INFERIOR MID FOREHEAD
LOCATION DETAILED: RIGHT LATERAL BUCCAL CHEEK
LOCATION DETAILED: LEFT INFERIOR LATERAL MALAR CHEEK
LOCATION DETAILED: LEFT CENTRAL LATERAL NECK
LOCATION DETAILED: LEFT INFERIOR MEDIAL MALAR CHEEK
LOCATION DETAILED: LEFT INFERIOR MEDIAL FOREHEAD
LOCATION DETAILED: RIGHT FOREHEAD
LOCATION DETAILED: RIGHT CENTRAL BUCCAL CHEEK
LOCATION DETAILED: RIGHT CENTRAL MANDIBULAR CHEEK
LOCATION DETAILED: RIGHT INFERIOR FOREHEAD
LOCATION DETAILED: LEFT INFERIOR CENTRAL MALAR CHEEK

## 2024-12-04 ASSESSMENT — LOCATION ZONE DERM
LOCATION ZONE: FACE
LOCATION ZONE: NECK

## 2024-12-04 ASSESSMENT — LOCATION SIMPLE DESCRIPTION DERM
LOCATION SIMPLE: LEFT CHEEK
LOCATION SIMPLE: RIGHT FOREHEAD
LOCATION SIMPLE: RIGHT CHEEK
LOCATION SIMPLE: INFERIOR FOREHEAD
LOCATION SIMPLE: LEFT FOREHEAD
LOCATION SIMPLE: NECK

## 2024-12-04 NOTE — PROCEDURE: LIQUID NITROGEN
Show Aperture Variable?: Yes
Consent: The patient's consent was obtained including but not limited to risks of crusting, scabbing, blistering, scarring, darker or lighter pigmentary change, recurrence, incomplete removal and infection.
Duration Of Freeze Thaw-Cycle (Seconds): 0
Render Note In Bullet Format When Appropriate: No
Post-Care Instructions: I reviewed with the patient in detail post-care instructions. Patient is to wear sunprotection, and avoid picking at any of the treated lesions. Pt may apply Vaseline to crusted or scabbing areas.
Detail Level: Detailed
Medical Necessity Information: It is in your best interest to select a reason for this procedure from the list below. All of these items fulfill various CMS LCD requirements except the new and changing color options.
Medical Necessity Clause: This procedure was medically necessary because the lesions that were treated were:  If lesion does not resolve, bx is needed.
Spray Paint Text: The liquid nitrogen was applied to the skin utilizing a spray paint frosting technique.

## 2025-04-24 ENCOUNTER — OFFICE VISIT (OUTPATIENT)
Dept: CARDIOLOGY | Facility: PHYSICIAN GROUP | Age: 75
End: 2025-04-24
Payer: MEDICARE

## 2025-04-24 VITALS
DIASTOLIC BLOOD PRESSURE: 68 MMHG | HEART RATE: 87 BPM | BODY MASS INDEX: 34.87 KG/M2 | SYSTOLIC BLOOD PRESSURE: 118 MMHG | OXYGEN SATURATION: 97 % | RESPIRATION RATE: 14 BRPM | WEIGHT: 217 LBS | HEIGHT: 66 IN

## 2025-04-24 DIAGNOSIS — Z95.2 S/P AVR (AORTIC VALVE REPLACEMENT) AND AORTOPLASTY: ICD-10-CM

## 2025-04-24 DIAGNOSIS — I50.22 CHF (CONGESTIVE HEART FAILURE), NYHA CLASS II, CHRONIC, SYSTOLIC (HCC): Chronic | ICD-10-CM

## 2025-04-24 DIAGNOSIS — E78.5 DYSLIPIDEMIA: Chronic | ICD-10-CM

## 2025-04-24 DIAGNOSIS — I44.7 LBBB (LEFT BUNDLE BRANCH BLOCK): ICD-10-CM

## 2025-04-24 PROCEDURE — 3074F SYST BP LT 130 MM HG: CPT | Performed by: NURSE PRACTITIONER

## 2025-04-24 PROCEDURE — 3078F DIAST BP <80 MM HG: CPT | Performed by: NURSE PRACTITIONER

## 2025-04-24 PROCEDURE — 99214 OFFICE O/P EST MOD 30 MIN: CPT | Performed by: NURSE PRACTITIONER

## 2025-04-24 RX ORDER — LOSARTAN POTASSIUM 25 MG/1
12.5 TABLET ORAL DAILY
Qty: 50 TABLET | Refills: 3 | Status: SHIPPED | OUTPATIENT
Start: 2025-04-24

## 2025-04-24 RX ORDER — CARVEDILOL 3.12 MG/1
3.12 TABLET ORAL 2 TIMES DAILY WITH MEALS
Qty: 200 TABLET | Refills: 3 | Status: SHIPPED | OUTPATIENT
Start: 2025-04-24

## 2025-04-24 ASSESSMENT — ENCOUNTER SYMPTOMS
INSOMNIA: 0
CHILLS: 0
NAUSEA: 0
HEADACHES: 0
PALPITATIONS: 0
PND: 0
LOSS OF CONSCIOUSNESS: 0
DIZZINESS: 0
MYALGIAS: 0
ORTHOPNEA: 0
BRUISES/BLEEDS EASILY: 0
SHORTNESS OF BREATH: 0
FEVER: 0
ABDOMINAL PAIN: 0

## 2025-04-24 ASSESSMENT — FIBROSIS 4 INDEX: FIB4 SCORE: 1.79032258064516129

## 2025-04-24 NOTE — PROGRESS NOTES
Chief Complaint   Patient presents with    Follow-Up    CHF (Chronic)    Prosthetic Heart Valve/Issue    Hyperlipidemia       Subjective     Deana Doss is a 74 y.o. female who presents today for annual follow-up of HFrEF, AVR, LBBB and hyperlipidemia.    Deana is a 74 year old female with history of severe AS, status post AVR in 2014 with some arash-operative atrial fibrillation, but none since; she also has HFrEF (LVEF 35% on echo in September 2024) treated medically, and  hyperlipidemia, but she declines statins, last seen by me in April 2025.    Last year, she agreed to start low dose Coreg and Losartan, due to low LVEF; she stopped taking them in February 2025 because she felt better.     She is here today for annual follow-up. She denies any chest pain, pressure, tightness or discomfort; no palpitations; no shortness of breath, orthopnea or PND; no dizziness or syncope; no edema. BP is good at home, running 120-130 systolic.     She knows her lipids are high, but continues to declines any treatment.    Past Medical History:   Diagnosis Date    Aortic stenosis 04/22/2014    AVR (23mm Hurst Perimount Magna pericardial valve, aortic root enlargement (1.5 cm CorMatrix patch)), by Dr. Rutledge, transient perioperative atrial fibrillation, no recurrence.    Atrial fibrillation (HCC) 04/24/2014    Transient, perioperative and not recurrent    CHF (congestive heart failure), NYHA class II, chronic, systolic ([facility]) 09/2024    Echocardiogram with LVEF 35%. Normal RA, LA and RV. AVR with normal function (Vmax 2.1m/s, mean 10mmHg, COURTNEY 1.2cm2). Mild MS/MR, mild TR.    Hypertension     Mixed hyperlipidemia     S/P AVR (aortic valve replacement) and aortoplasty 04/24/2014    23mm Hurst Perimount Magna pericardial valve, aortic root enlargement (1.5 cm CorMatrix patch), by Dr. Rutledge.     Past Surgical History:   Procedure Laterality Date    CATARACT EXTRACTION WITH IOL Bilateral 12/2021    AORTIC VALVE REPLACEMENT   2014    Performed by Lilly Rutledge M.D. at SURGERY [facility] ORS    ORIF, WRIST  2010    Performed by OSGOOD, PATRICK J at SURGERY [facility] ORS    TENDON REPAIR  2010    Performed by OSGOOD, PATRICK J at SURGERY [facility] ORS    CYSTECTOMY      Bracial Cleft cyst removed at [facility]     Family History   Problem Relation Age of Onset    Non-contributory Mother         basically healthy    Heart Attack Father 76        MI    Hypertension Father     Hyperlipidemia Father     Diabetes Paternal Grandfather 80        Type II Diabetes     Social History     Socioeconomic History    Marital status:      Spouse name: Not on file    Number of children: Not on file    Years of education: Not on file    Highest education level: Not on file   Occupational History    Not on file   Tobacco Use    Smoking status: Former     Current packs/day: 0.00     Average packs/day: 2.0 packs/day for 30.0 years (60.0 ttl pk-yrs)     Types: Cigarettes     Start date: 1966     Quit date: 1996     Years since quittin.0    Smokeless tobacco: Never    Tobacco comments:     2 packs a day for at least 30 years    Vaping Use    Vaping status: Never Used   Substance and Sexual Activity    Alcohol use: Yes     Alcohol/week: 0.0 oz     Comment: occ    Drug use: No    Sexual activity: Not Currently     Partners: Male     Birth control/protection: Post-Menopausal     Comment: , retired   Other Topics Concern    Not on file   Social History Narrative    Not on file     Social Drivers of Health     Financial Resource Strain: Not on file   Food Insecurity: Not on file   Transportation Needs: Not on file   Physical Activity: Not on file   Stress: Not on file   Social Connections: Not on file   Intimate Partner Violence: Not on file   Housing Stability: Not on file     Allergies   Allergen Reactions    Cipro Xr Hives    Lipitor [Atorvastatin] Myalgia     Outpatient Encounter Medications as of 2025  "  Medication Sig Dispense Refill    carvedilol (COREG) 3.125 MG Tab Take 1 Tablet by mouth 2 times a day with meals. 200 Tablet 3    losartan (COZAAR) 25 MG Tab Take 0.5 Tablets by mouth every day. 50 Tablet 3    amoxicillin (AMOXIL) 500 MG Cap as needed. PRN for Dental Procedures      aspirin EC (ECOTRIN) 81 MG TBEC Take 81 mg by mouth every day.      [DISCONTINUED] carvedilol (COREG) 3.125 MG Tab Take 1 Tablet by mouth 2 times a day with meals. 180 Tablet 0    [DISCONTINUED] losartan (COZAAR) 25 MG Tab Take 0.5 Tablets by mouth every day. 45 Tablet 0     No facility-administered encounter medications on file as of 4/24/2025.     Review of Systems   Constitutional:  Negative for chills and fever.   Respiratory:  Negative for shortness of breath.    Cardiovascular:  Negative for chest pain, palpitations, orthopnea, leg swelling and PND.   Gastrointestinal:  Negative for abdominal pain and nausea.   Musculoskeletal:  Negative for myalgias.   Neurological:  Negative for dizziness, loss of consciousness and headaches.   Endo/Heme/Allergies:  Does not bruise/bleed easily.   Psychiatric/Behavioral:  The patient does not have insomnia.               Objective     /68 (BP Location: Left arm, Patient Position: Sitting, BP Cuff Size: Adult)   Pulse 87   Resp 14   Ht 1.676 m (5' 6\")   Wt 98.4 kg (217 lb)   SpO2 97%   BMI 35.02 kg/m²     Physical Exam  Constitutional:       General: She is not in acute distress.     Appearance: She is well-developed. She is not diaphoretic.   HENT:      Head: Normocephalic.   Eyes:      General: No scleral icterus.  Neck:      Vascular: No JVD.   Cardiovascular:      Rate and Rhythm: Normal rate and regular rhythm.      Heart sounds: Murmur heard.      Systolic murmur is present with a grade of 2/6.      No friction rub. No gallop.      Comments: Sternotomy scar present.  Murmur at RUSB  Pulmonary:      Effort: Pulmonary effort is normal. No respiratory distress.      Breath " sounds: Normal breath sounds. No wheezing or rales.   Abdominal:      General: Bowel sounds are normal. There is no distension.      Palpations: Abdomen is soft.      Tenderness: There is no abdominal tenderness.   Musculoskeletal:         General: Normal range of motion.      Cervical back: Normal range of motion and neck supple.   Skin:     General: Skin is warm and dry.      Findings: No rash.   Neurological:      Mental Status: She is alert and oriented to person, place, and time.       ECHOCARDIOGRAPHY:    Echocardiogram of September 2024 ([facility]):  LVEF 35%. Normal RA, LA and RV. AVR with normal function (Vmax 2.1m/s, mean 10mmHg, COURTNEY 1.2cm2). Mild MS/MR, mild TR.    Echocardiogram of May 2022 ([facility]):  Normal LV size  LLVEF 40-45%  Prosthetic AV with normal function  Mild MR, mild TR  RVSP 37mmHg     RESULTS OF ECHOCARDIOGRAM OF 6/5/2019:  Normal LV size  LVEF 40%  Global hypokinesis  Normal RA, RV  Mildly dilated LA  Moderate MR  Normal function of prosthetic AV (COURTNEY 1.0cm2, mean 13.7mmHg, peak 23.1mmHg, Vmax 2.4m/s)  Mild TR      PROCEDURES:    RESULTS OF OPERATION OF 4/25/2014:  POSTOPERATIVE DIAGNOSES:  Severe aortic stenosis (calcific or degenerative), bicuspid aortic valve, congestive heart failure ([place] Heart Association class II-III), chronic left ventricular systolic and diastolic failure, mild mitral regurgitation, dyslipidemia.  PROCEDURE PERFORMED:  Aortic valve replacement (23 mm Hurst Perimount Magna pericardial valve), aortic root enlargement (1.5 cm CorMatrix patch), and intraoperative transesophageal echocardiography.     LABS:    Labs as of 3/21/2025 ([facility])  Glucose 90  BUN 15  Creatinine 0.87  Potassium 4.2  GFR 70  WBC 7.5  RBC 5.0  Hgb 14.9  Hct 45.7  Platelets 222  Cholesterol 249  Triglycerides 102  HDL 80    Cholesterol/HDL ratio 3.0    Assessment & Plan     1. S/P AVR (aortic valve replacement) and aortoplasty  EC-ECHOCARDIOGRAM COMPLETE W/O CONT      2. CHF (congestive  heart failure), NYHA class II, chronic, systolic (HCC)  EC-ECHOCARDIOGRAM COMPLETE W/O CONT    carvedilol (COREG) 3.125 MG Tab    losartan (COZAAR) 25 MG Tab      3. LBBB (left bundle branch block)        4. Dyslipidemia            Medical Decision Making: Today's Assessment/Status/Plan:        1. History of severe AS, status post AVR in April 2014. Echocardiogram in September 2024 showed LVEF 35%, and normal function of valve. We started her on low dose Coreg and Losartan, but she stopped taking them in February 2025 as she felt better. To repeat echo, to assess LVEF; we did briefly talk about implications of LVEF 35% (increased arrhythmias, SCA, need for AICD).     2. History of HFrEF, stage C, class II, LVEF 35% on echo in September 2024, on ASA only. She was previously on low dose Coreg and Losartan; to repeat echo.     3. LBBB, stable.     4. Hyperlipidemia, declines statin therapy/intolerance. Lifestyle modifications including dietary change and exercise.     Continue ASA. Antibiotics prophylaxis for dental procedures.   We will repeat echocardiogram. Labs per PCP.      Follow-up annually, sooner if clinical condition changes.